# Patient Record
Sex: FEMALE | Race: WHITE | Employment: FULL TIME | URBAN - METROPOLITAN AREA
[De-identification: names, ages, dates, MRNs, and addresses within clinical notes are randomized per-mention and may not be internally consistent; named-entity substitution may affect disease eponyms.]

---

## 2021-10-28 ENCOUNTER — HOSPITAL ENCOUNTER (OUTPATIENT)
Dept: GENERAL RADIOLOGY | Age: 36
Discharge: HOME | End: 2021-10-28
Attending: NURSE PRACTITIONER

## 2021-10-28 DIAGNOSIS — M54.6 ACUTE BILATERAL THORACIC BACK PAIN: ICD-10-CM

## 2021-10-28 PROBLEM — G43.009 MIGRAINE WITHOUT AURA AND WITHOUT STATUS MIGRAINOSUS, NOT INTRACTABLE: Status: ACTIVE | Noted: 2021-10-28

## 2021-10-28 PROBLEM — Z00.00 PREVENTATIVE HEALTH CARE: Status: ACTIVE | Noted: 2021-10-28

## 2021-10-28 PROBLEM — K59.00 CONSTIPATION: Status: ACTIVE | Noted: 2021-10-28

## 2021-10-28 PROBLEM — F17.200 CURRENT SMOKER: Status: ACTIVE | Noted: 2021-10-28

## 2021-10-29 NOTE — PROGRESS NOTES
Please notify xray with mild spinal curvature but otherwise unremarkable.   If symptoms persist I recommend ortho referral.  am

## 2021-11-02 NOTE — PROGRESS NOTES
Per Yarelis Bass NP, Please notify xray with mild spinal curvature but otherwise unremarkable.   If symptoms persist I recommend ortho referral.--Notified via UTOPY message

## 2021-11-17 ENCOUNTER — HOSPITAL ENCOUNTER (OUTPATIENT)
Dept: PHYSICAL THERAPY | Age: 36
Discharge: HOME | End: 2021-11-17
Payer: OTHER GOVERNMENT

## 2021-11-17 DIAGNOSIS — M54.6 THORACIC BACK PAIN, UNSPECIFIED BACK PAIN LATERALITY, UNSPECIFIED CHRONICITY: ICD-10-CM

## 2021-11-17 PROCEDURE — 97110 THERAPEUTIC EXERCISES: CPT

## 2021-11-17 PROCEDURE — 97161 PT EVAL LOW COMPLEX 20 MIN: CPT

## 2021-11-17 PROCEDURE — 97140 MANUAL THERAPY 1/> REGIONS: CPT

## 2021-11-17 NOTE — THERAPY EVALUATION
Paresh Hamilton  : 1985      Payor: Hubert Hendrickson / Plan: Binh Grant 74 / Product Type:  /  [address] [facility] at [address] [place] [address]. [facility], [address], [facility], [address] [place] [address]  [phone]   [phone]       OUTPATIENT PHYSICAL THERAPY:Initial Assessment 2021      ICD-10: Treatment Diagnosis:   Pain in thoracic spine (M54.6)  Muscle weakness (generalized) (M62.81)                 Precautions/Allergies:   Fish containing products   Fall Risk Score: 0 (? 5 = High Risk)  MD Orders: Eval and Treat    MEDICAL/REFERRING DIAGNOSIS:  Thoracic back pain, unspecified back pain laterality, unspecified chronicity [M54.6]   DATE OF ONSET: About 1 year ago  REFERRING PHYSICIAN: Nirmal Murillo MD  RETURN PHYSICIAN APPOINTMENT: 2021     INITIAL ASSESSMENT:  Ms. Paresh Hamilton presents to physical therapy with decreased postural and hip/core strength, ROM, joint mobility, flexibility, functional mobility, and increased pain. No pelvic malalignment upon initial evaluation but decreased posture. These S/S are consistent with above diagnosis. Paresh Hamilton will benefit from skilled physical therapy (medically necessary) to address above deficits affecting participation in basic ADLs and functional mobility/tolerance. Patient will benefit from manual therapeutic techniques (stretching, joint mobilizations, soft tissue mobilization/myofascial release), therapeutic exercises and activities, postural strengthening/education, and comprehensive home exercises program to address current impairments and functional limitations. PROBLEM LIST (Impacting functional limitations):  1. Decreased Strength  2. Decreased ADL/Functional Activities  3. Decreased Transfer Abilities  4. Decreased Ambulation Ability/Technique  5. Decreased Balance  6. Increased Pain  7. Decreased Activity Tolerance  8. Increased Fatigue  9. Increased Shortness of Breath  10.  Decreased Flexibility/Joint Mobility  11. Decreased [place] with Home Exercise Program INTERVENTIONS PLANNED:  1. Balance Exercise  2. Bed Mobility  3. Cold  4. Cryotherapy  5. Electrical Stimulation  6. Family Education  7. Gait Training  8. Heat  9. Home Exercise Program (HEP)  10. Manual Therapy  11. Neuromuscular Re-education/Strengthening  12. Range of Motion (ROM)  13. Therapeutic Activites  14. Therapeutic Exercise/Strengthening  15. Transfer Training  16. Lumbar Traction  17. Aquatic Therapy   TREATMENT PLAN:  Effective Dates: 11/17/2021 TO 1/16/2022 (60 days). Frequency/Duration: 2 times a week for 60 Days  GOALS: (Goals have been discussed and agreed upon with patient.)     Short-Term Goals~4 weeks  Goal Met   1. Primus Backers will be independent with HEP 1.  [] Date:   2. Primus Backers will participate in LE stretching program to increase flexibility    2. [] Date:   3. Primus Backers will participate in core stabilization exercises to help with stabilization during ADLs 3. [] Date:   4. Primus Backers will participate in LE strengthening program with weights/resistance as appropriate to help with gait and elevations 4. [] Date:   5. Primus Backers will participate in static and dynamic balance activities to decrease the risk for falls       5. [] Date:   6. Primus Backers will tolerate manual therapy/joint mobilizations/soft tissue to increase ROM and decrease pain  6. [] Date:              Long Term Goals~8 weeks Goal Met   1. Primus Backers will demonstrate a 10 point improvement on the Oswestry to show improvement in function 1. [] Date:   2. Primus Backers will report 0/10 pain at rest and during ADLs  2. [] Date:   3. Primus Backers will demonstrate 5/5 LE strength on manual muscle testing 3. [] Date:   4. Primus Backers will be able to perform SLS >5 seconds bilaterally to help with gait and improve balance 4. [] Date:                 Outcome Measure:    Tool Used: Modified Oswestry Low Back Pain Questionnaire  Score:  Initial: 16/50  Most Recent: X/50 (Date: -- )   Interpretation of Score: Each section is scored on a 0-5 scale, 5 representing the greatest disability. The scores of each section are added together for a total score of 50. Medical Necessity:   · Skilled intervention continues to be required due to above deficits affecting participation in basic ADLs and overall functional tolerance. Reason for Services/Other Comments:  · Patient continues to require skilled intervention due to  above deficits affecting participation in basic ADLs and overall functional tolerance. Total Treatment Duration:  PT Patient Time In/Time Out  Time In: 0940  Time Out: 0843    Rehabilitation Potential For Stated Goals: [address] Mauricio's therapy, I certify that the treatment plan above will be carried out by a therapist or under their direction. Thank you for this referral,  Jammie Roman, YESENIA     Referring Physician Signature: Allyssa Costello MD _______________________________ Date _____________            HISTORY:   History of Present Injury/Illness (Reason for Referral):  Patient reports start of mid back pain about 1 year ago without known cause. She states that symptoms are worst in the AM and limit activity, cleaning and lifting.    -Present symptoms/complaints (on day of evaluation)tart of   Pain Scale:  · Current: 7/10  · Best: 2/10  · Worst: 9/10      · Aggravating factors: Standing, Walking, sitting, bending and Lifting   · Relieving factors: Rest  · Irritability: Medium (Onset of pain is equal to alleviation)      Past Medical History/Comorbidities:   Ms. Chris Gutierrez  has no past medical history on file. Ms. Chris Gutierrez  has a past surgical history that includes hx tubal ligation and hx tonsillectomy.   Social History/Living Environment:        Social History     Socioeconomic History    Marital status:      Spouse name: Not on file    Number of children: Not on file    Years of education: Not on file    Highest education level: Not on file   Occupational History    Not on file   Tobacco Use    Smoking status: Current Every Day Smoker     Packs/day: 0.50     Years: 18.00     Pack years: 9.00     Types: Cigarettes    Smokeless tobacco: Never Used   Substance and Sexual Activity    Alcohol use: Never    Drug use: Never    Sexual activity: Not on file   Other Topics Concern    Not on file   Social History Narrative    Not on file     Social Determinants of Health     Financial Resource Strain:     Difficulty of Paying Living Expenses: Not on file   Food Insecurity:     Worried About Running Out of Food in the Last Year: Not on file    Ted of Food in the Last Year: Not on file   Transportation Needs:     Lack of Transportation (Medical): Not on file    Lack of Transportation (Non-Medical):  Not on file   Physical Activity:     Days of Exercise per Week: Not on file    Minutes of Exercise per Session: Not on file   Stress:     Feeling of Stress : Not on file   Social Connections:     Frequency of Communication with Friends and Family: Not on file    Frequency of Social Gatherings with Friends and Family: Not on file    Attends Druze Services: Not on file    Active Member of [address] [place] [address] [employer] or Organizations: Not on file    Attends Club or Organization Meetings: Not on file    Marital Status: Not on file   Intimate Partner Violence:     Fear of Current or Ex-Partner: Not on file    Emotionally Abused: Not on file    Physically Abused: Not on file    Sexually Abused: Not on file   Housing Stability:     Unable to Pay for Housing in the Last Year: Not on file    Number of [place] in the Last Year: Not on file    Unstable Housing in the Last Year: Not on file     Prior Level of Function/Work/Activity:  Normal  Previous Treatment Approach  none  Dominant Side: Right  Other Clinical Tests  X-RAY Negative for Fx    Active Ambulatory Problems     Diagnosis Date Noted    Current smoker 10/28/2021    Preventative health care 10/28/2021    Migraine without aura and without status migrainosus, not intractable 10/28/2021    Constipation 10/28/2021     Resolved Ambulatory Problems     Diagnosis Date Noted    No Resolved Ambulatory Problems     No Additional Past Medical History     Note: Patient denies any increase of symptoms with cough, sneeze or valsalva. Patient denies any saddle paresthesia or bowel/bladder deficits. Current Medications:    Current Outpatient Medications:     predniSONE (DELTASONE) 10 mg tablet, Take 10 mg by mouth Follow dosing instructions for 12 days. , Disp: 48 Tablet, Rfl: 0    traMADoL (ULTRAM) 50 mg tablet, Take 1 Tablet by mouth every six (6) hours as needed for Pain for up to 7 days. Max Daily Amount: 200 mg., Disp: 28 Tablet, Rfl: 0    butalbital-acetaminophen-caffeine (FIORICET, ESGIC) -40 mg per tablet, Take 1 Tablet by mouth every six (6) hours as needed for Headache or Migraine. , Disp: 20 Tablet, Rfl: 0    diclofenac EC (VOLTAREN) 50 mg EC tablet, Take 1 Tablet by mouth two (2) times a day for 30 days. , Disp: 60 Tablet, Rfl: 0    buPROPion XL (WELLBUTRIN XL) 150 mg tablet, Take 1 Tablet by mouth every morning., Disp: 30 Tablet, Rfl: 0      Ambulatory/Rehab Services H2 Model Falls Risk Assessment    Risk Factors:       No Risk Factors Identified Ability to Rise from Chair:       (0)  Ability to rise in a single movement    Falls Prevention Plan:       No modifications necessary   Total: (5 or greater = High Risk): 0    ©2010 [facility] [employer]. All Rights Reserved. [facility] Patent #[id number].  Federal Law prohibits the replication, distribution or use without written permission from [facility] [employer]       Date Last Reviewed:  11/17/2021   Number of Personal Factors/Comorbidities that affect the Plan of Care: 0: LOW COMPLEXITY   EXAMINATION:   Observation/Orthostatic Postural Assessment:          Rounded Shoulders, Scoliosis Increased Lumbar Lordosis and Right Lateral Shift  Palpation:          Increased Tenderness to Spinous Process T8-10, L4-5    ROM:            AROM/PROM      Joint: Initial Assessment: 11/17/2021   Active ROM RIGHT LEFT   Knee Extension WNL WNL   Knee Flexion WNL WNL   Hip Flexion WNL WNL   Hip Abduction WNL WNL     Lumbar ROM     Movement Range Descriptor Degrees Notes   Flexion Hands to Shin Pain with Return 55    Extension Shoulder > Midline Pain at End Range 10    Sidebending Hands to Thigh Unremarkable 20               Repeated Motion:  Direction    Frequency Symptoms Prior Symptons Post   Flexion Repeated 10 times  Increased Symptoms   Extension Repeated 10 times  Increased Symptoms         Strength:    Initial Assessment:11/17/2021       RIGHT LEFT   Knee Flexion (L5-S2)  5/5  5/5   Knee Extension (L3, L4)  5/5  5/5   Hip Flexion (L1, L2)  4/5  4+/5   Hip Extension  4+/5  4+/5   Hip Abduction (L5, S1)  4/5  4+/5   Ankle Dorsiflexion (L4)  5/5  5/5   Great Toe Extension (L5)  5/5  5/5   Ankle Plantar Flexion (S1-S2)  5/5  5/5       Special Tests:  Lumbar:  SLR: Negative  SLUMP: Negative   SI Joint:  SI Compression: Positive Anterior rotation right   Hip:  Scouring:Negative  Fabers:Negative         Manual:  Initial Assessment  11/17/2021     Joint/Area Directon Grade Treatment Effect   Thoracic 8-10 PA II and III Reproduced Symptoms   Lumbar/SI  L3-5 PA II and III Improved Symptoms post treatment             Reflex Testing:    Location Left Right   Patellar (L4) normal normal   Achilles (S1) normal normal       Neurological Screen:    RADIATING SYMPTOMS: No  Upper motor [place] screen         Clonus: Negative         Babinski: Negative    Functional Mobility:  Affecting participation in basic ADLs and functional tasks.     Balance and Mobility:    Test Result   Timed up and Go NT   30 second Sit to Stand NT   Single Leg Balance Right: <15 seconds     Left:<30 seconds          Body Structures Involved:  1. Bones  2. Joints  3. Muscles  4. Ligaments Body Functions Affected:  1. Sensory/Pain  2. Neuromusculoskeletal  3. Movement Related Activities and Participation Affected:  1. Mobility  2.  Self Care   Number of elements that affect the Plan of Care: 1-2: LOW COMPLEXITY   CLINICAL PRESENTATION:   Presentation: Stable and uncomplicated: LOW COMPLEXITY   CLINICAL DECISION MAKING:      Use of outcome tool(s) and clinical judgement create a POC that gives a: Clear prediction of patient's progress: LOW COMPLEXITY     See associated treatment note for treatment provided today      Future Appointments   Date Time Provider Estela Mccoy   11/19/2021  9:00 AM Vahe Marlo, PT [facility]   11/22/2021  9:15 AM Vahe Marlo, PT [facility]   11/24/2021  9:15 AM Vahe Marlo, PT [facility]   11/29/2021  9:30 AM Vahe Marlo, PT [facility]   12/1/2021  9:30 AM Vahe Marlo, PT [facility]   12/6/2021  9:30 AM Vahe Marlo, PT [facility]   12/7/2021 10:00 AM Armando Maurice, NP [facility]   12/8/2021  9:30 AM Vahe Marlo, PT [facility]   12/13/2021  9:30 AM Vahe Marlo, PT [facility]   12/15/2021  9:30 AM Vahe Marlo, PT [facility]   12/20/2021  9:30 AM Vahe Marlo, PT [facility]   12/22/2021  9:30 AM Vahe Marlo, PT [facility]   12/29/2021  8:45 AM Veronica Elkins MD [facility] E[facility] Loaiza, PT

## 2021-11-17 NOTE — PROGRESS NOTES
[email or web address]  : 1985  Payor: Sahye Meza / Plan: Binh Grant 74 / Product Type:  /  [address] at [address] Vinay Gabriel, [address], [address]  [phone]   [phone]                                                          Lashon Koehler MD      OUTPATIENT PHYSICAL THERAPY: Daily Treatment Note 2021 Visit Count:  1    ICD-10: Treatment Diagnosis:   Pain in thoracic spine (M54.6)  Muscle weakness (generalized) (M62.81)                        Precautions/Allergies:   Fish containing products   Fall Risk Score: 0 (? 5 = High Risk)  MD Orders: Eval and Treat     MEDICAL/REFERRING DIAGNOSIS:  Thoracic back pain, unspecified back pain laterality, unspecified chronicity [M54.6]   DATE OF ONSET: About 1 year ago  REFERRING PHYSICIAN: Lashon Koehler MD  RETURN PHYSICIAN APPOINTMENT: 2021           Pre-treatment Symptoms/Complaints: See Initial Eval Dated 2021 for more details. Pain: Initial:7/10  Medications Last Reviewed:  2021     Post Session: 7/10   Updated Objective Findings: See Initial Eval for more details. TREATMENT:   THERAPEUTIC EXERCISE: (15 minutes):  Exercises per grid below to improve mobility, strength and balance. Required minimal visual, verbal and manual cues to promote proper body alignment and promote proper body posture. Progressed resistance and complexity of movement as indicated. Date:  2021 Date:   Date:     Activity/Exercise Parameters Parameters Parameters   Education HEP, POC, PT goals, anatomy/pathology     TM      Nustep      L stretch      SKTC [id number]     Open book 15x R/L     John pose [id number]     Cat Camel [id number]                             THERAPEUTIC ACTIVITY: ( 0 minutes):  Activities per gid below to improve functional movement related mobility, strength and balance to improve neuro-muscular carryover to daily functional activities for improving patient's quality of life. Required visual, verbal and manual cues to promote proper body alignment and promote proper body posture/mechanics. Progressed resistance and complexity of movement as indicated. Date:  11/17/2021 Date:   Date:     Activity/Exercise Parameters Parameters Parameters                                                                               MANUAL THERAPY: (8 minutes): Joint mobilization, Soft tissue mobilization was utilized and necessary because of the patient's restricted joint motion and restricted motion of soft tissue mobility. Date  11/17/2021    Technique Used Grade  Level # Time(s) Effect while being performed          Joint Mobs PA II III T8-10, L3-5 5min Improved trunk flexion ROM                 MET  R SI 3min For anterior rotation right innominate                              HEP Log Date 1.    11/17/2021   2.  11/17/2021   3. 11/17/2021   4.    5.           [email or web address] Portal  Treatment/Session Summary:    Response to Treatment: Pt demonstrated understanding of POC and initial HEP. No increase in pain or adverse reactions. Communication/Consultation:  POC, HEP, PT goals, Faxed initial evaluation to MD.   Equipment provided today: HEP Handout   Recommendations/Intent for next treatment session:   Next visit will focus on Manual Therapy Core Stability Pain Science Education Hip strengthening soft tissue mobilization. Treatment Plan of Care Effective Dates: 11/17/2021 TO 1/16/2022 (60 days).   Frequency/Duration: 2 times a week for 60 Days             Total Treatment Billable Duration:   23  Rx plus Eval   PT Patient Time In/Time Out  Time In: 0940  Time Out: 80 Jr Dotty Mortensen Se, PT    Future Appointments   Date Time Provider Estela Mccoy   11/19/2021  9:00 AM Anson Ledezma PT [facility]   11/22/2021  9:15 AM Anson Ledezma PT [facility]   11/24/2021  9:15 AM Anson Ledezma PT [facility] 11/29/2021  9:30 AM Miles Phi, PT [facility]   12/1/2021  9:30 AM Miles Phi, PT [facility]   12/6/2021  9:30 AM Miles Phi, PT [facility]   12/7/2021 10:00 AM Mey Sands, NP [facility]   12/8/2021  9:30 AM Miles Phi, PT [facility]   12/13/2021  9:30 AM Miles Phi, PT [facility]   12/15/2021  9:30 AM Miles Phi, PT [facility]   12/20/2021  9:30 AM Miles Phi, PT [facility] [place] [facility]   12/22/2021  9:30 AM Miles Phi, PT [facility]   12/29/2021  8:45 AM MD ALICE Chung A

## 2021-11-19 ENCOUNTER — HOSPITAL ENCOUNTER (OUTPATIENT)
Dept: PHYSICAL THERAPY | Age: 36
Discharge: HOME | End: 2021-11-19
Payer: OTHER GOVERNMENT

## 2021-11-19 PROCEDURE — 97110 THERAPEUTIC EXERCISES: CPT

## 2021-11-19 NOTE — PROGRESS NOTES
----- Message from Rayo Basilio sent at 4/11/2019 12:13 PM CDT -----  Contact: Patient  Type:  Patient Returning Call    Who Called:  Patient  Who Left Message for Patient:  Thuy Mahoney  Does the patient know what this is regarding?:  Previous message  Best Call Back Number:  [phone]  Additional Information:  NA   Eva Bush  : 1985  Payor: Laure Wick / Plan: Binh Grant 74 / Product Type:  /  [address] [facility] at [address] [place] [address]. William Mckeon, [address] [place], [address] [facility]  [phone]   [phone]                                                          Michael Garsia MD      OUTPATIENT PHYSICAL THERAPY: Daily Treatment Note 2021 Visit Count:  2    ICD-10: Treatment Diagnosis:   Pain in thoracic spine (M54.6)  Muscle weakness (generalized) (M62.81)                        Precautions/Allergies:   Fish containing products   Fall Risk Score: 0 (? 5 = High Risk)  MD Orders: Eval and Treat     MEDICAL/REFERRING DIAGNOSIS:  Thoracic back pain, unspecified back pain laterality, unspecified chronicity [M54.6]   DATE OF ONSET: About 1 year ago  REFERRING PHYSICIAN: Michael Garsia MD  RETURN PHYSICIAN APPOINTMENT: 2021           Pre-treatment Symptoms/Complaints: Patient reports improvement with starting stretching. Pain: Initial:4/10  Medications Last Reviewed:  2021     Post Session: 4/10   Updated Objective Findings: See Initial Eval for more details. TREATMENT:   THERAPEUTIC EXERCISE: (40 minutes):  Exercises per grid below to improve mobility, strength and balance. Required minimal visual, verbal and manual cues to promote proper body alignment and promote proper body posture. Progressed resistance and complexity of movement as indicated. Date:  2021 Date:  2021 Date:     Activity/Exercise Parameters Parameters Parameters   Education HEP, POC, PT goals, anatomy/pathology     TM      Nustep  8min L4    L stretch  [id number]    Rings  3min    SI self mob  [id number] right    SKTC [id number]     Open book 15x R/L 15x R/L    Cheryle Mohan pose [id number]     Cat Camel [id number]     Row  2x10 red    Low Row  2x10 red    No Money  2x10 green    Pull apart  2x10 green                      THERAPEUTIC ACTIVITY: ( 0 minutes):  Activities per gid below to improve functional movement related mobility, strength and balance to improve neuro-muscular carryover to daily functional activities for improving patient's quality of life. Required visual, verbal and manual cues to promote proper body alignment and promote proper body posture/mechanics. Progressed resistance and complexity of movement as indicated. Date:  11/19/2021 Date:   Date:     Activity/Exercise Parameters Parameters Parameters                                                                               MANUAL THERAPY: (0 minutes): Joint mobilization, Soft tissue mobilization was utilized and necessary because of the patient's restricted joint motion and restricted motion of soft tissue mobility. Date  11/19/2021    Technique Used Grade  Level # Time(s) Effect while being performed          Joint Mobs PA II III T8-10, L3-5 5min Improved trunk flexion ROM                 MET  R SI 3min For anterior rotation right innominate                              HEP Log Date 1.    11/19/2021   2.  11/19/2021   3. 11/19/2021   4.    5.           [facility] Portal  Treatment/Session Summary:    Response to Treatment: Pt demonstrated understanding of POC and initial HEP. No increase in pain or adverse reactions. Communication/Consultation:  POC, HEP, PT goals, Faxed initial evaluation to MD.   Equipment provided today: HEP Handout   Recommendations/Intent for next treatment session:   Next visit will focus on Manual Therapy Core Stability Pain Science Education Hip strengthening soft tissue mobilization. Treatment Plan of Care Effective Dates: 11/17/2021 TO 1/16/2022 (60 days).   Frequency/Duration: 2 times a week for 60 Days             Total Treatment Billable Duration:   40  Rx  PT Patient Time In/Time Out  Time In: 1606  Time Out: 9205 [facility], PT    Future Appointments   Date Time Provider Estela Mccoy   11/22/2021  9:15 AM Jc Brunner PT [facility] [place] [facility]   11/29/2021  9:30 AM Redell Hoof, PT [facility]   12/1/2021  9:30 AM Redell Hoof, PT [facility]   12/6/2021  9:30 AM Redell Hoof, PT [facility]   12/7/2021 10:00 AM Danielle Francis NP [facility]   12/8/2021  9:30 AM Redell Hoof, PT [facility]   12/13/2021  9:30 AM Redell Hoof, PT [facility]   12/15/2021  9:30 AM Redell Hoof, PT [facility]   12/20/2021  9:30 AM Redell Hoof, PT [facility] [place] [facility]   12/22/2021  9:30 AM Redell Hoof, PT [facility]   12/29/2021  8:45 AM Gerldine Gosselin, MD [facility]

## 2021-11-22 ENCOUNTER — HOSPITAL ENCOUNTER (OUTPATIENT)
Dept: PHYSICAL THERAPY | Age: 36
Discharge: HOME | End: 2021-11-22
Payer: OTHER GOVERNMENT

## 2021-11-22 PROCEDURE — 97110 THERAPEUTIC EXERCISES: CPT

## 2021-11-22 NOTE — PROGRESS NOTES
Marisol Ritter  : 1985  Payor: Ivis Snowball / Plan: Binh Grant 74 / Product Type:  /  [facility] [address]. [address], [address] [place] [address] Road  [phone]   [phone]                                                          Jordin Arambula MD      OUTPATIENT PHYSICAL THERAPY: Daily Treatment Note 2021 Visit Count:  3    ICD-10: Treatment Diagnosis:   Pain in thoracic spine (M54.6)  Muscle weakness (generalized) (M62.81)                        Precautions/Allergies:   Fish containing products   Fall Risk Score: 0 (? 5 = High Risk)  MD Orders: Eval and Treat     MEDICAL/REFERRING DIAGNOSIS:  Thoracic back pain, unspecified back pain laterality, unspecified chronicity [M54.6]   DATE OF ONSET: About 1 year ago  REFERRING PHYSICIAN: Jordin Arambula MD  RETURN PHYSICIAN APPOINTMENT: 2021           Pre-treatment Symptoms/Complaints: Patient reports being just tired from busy weekend. She also reports being able to do yard work without increased pain    Pain: Initial:1/10  Medications Last Reviewed:  2021     Post Session: 1/10   Updated Objective Findings: See Initial Eval for more details. TREATMENT:   THERAPEUTIC EXERCISE: (40 minutes):  Exercises per grid below to improve mobility, strength and balance. Required minimal visual, verbal and manual cues to promote proper body alignment and promote proper body posture. Progressed resistance and complexity of movement as indicated.      Date:  2021 Date:  2021 Date:  2021   Activity/Exercise Parameters Parameters Parameters   Education HEP, POC, PT goals, anatomy/pathology     TM      Nustep  8min L4 8min L4   L stretch  [id number]   Rings  3min 3min   SI self mob  [id number] right    SKTC [id number]     Open book 15x R/L 15x R/L    John pose [id number]     Cat Camel [id number]     Row  2x10 red 2x10  23#   Low Row  2x10 red    Pull down   2x10 27#   No Money  2x10 green 2x10 green   Pull apart  2x10 green 2x10 green   Julio carry   2/15#  4 laps   Bilateral ER w/ flexion and lift off   15x orange   Sit to stand   2x10 plinth               THERAPEUTIC ACTIVITY: ( 0 minutes): Activities per gid below to improve functional movement related mobility, strength and balance to improve neuro-muscular carryover to daily functional activities for improving patient's quality of life. Required visual, verbal and manual cues to promote proper body alignment and promote proper body posture/mechanics. Progressed resistance and complexity of movement as indicated. Date:  11/22/2021 Date:   Date:     Activity/Exercise Parameters Parameters Parameters                                                                               MANUAL THERAPY: (0 minutes): Joint mobilization, Soft tissue mobilization was utilized and necessary because of the patient's restricted joint motion and restricted motion of soft tissue mobility. Date  11/22/2021    Technique Used Grade  Level # Time(s) Effect while being performed          Joint Mobs PA II III T8-10, L3-5  Improved trunk flexion ROM                 MET  R SI  For anterior rotation right innominate                              HEP Log Date 1.    11/22/2021   2.  11/22/2021   3. 11/22/2021   4.    5.           [employer] [facility]  Treatment/Session Summary:    Response to Treatment: Patient was progressed with scapular and core strengthening without pain   Communication/Consultation:  Posture education   Equipment provided today: HEP Handout   Recommendations/Intent for next treatment session:   Next visit will focus on Manual Therapy Core Stability Pain Science Education Hip strengthening soft tissue mobilization. Treatment Plan of Care Effective Dates: 11/17/2021 TO 1/16/2022 (60 days).   Frequency/Duration: 2 times a week for 60 Days             Total Treatment Billable Duration:   40  Rx  PT Patient Time In/Time Out  Time In: 0915  Time Out: [address], PT    Future Appointments   Date Time Provider Estela Nadine   11/29/2021  9:30 AM Jeanie Victor, PT [facility] [place] [facility]   12/1/2021  9:30 AM Harl Valente, PT [facility]   12/6/2021  9:30 AM Harl Valente, PT [facility]   12/7/2021 10:00 AM Cyndi Frey NP [facility]   12/8/2021  9:30 AM Harl Valente, PT [facility]   12/13/2021  9:30 AM Harl Valente, PT [facility]   12/15/2021  9:30 AM Harl Valente, PT [facility]   12/20/2021  9:30 AM Harl Valente, PT [facility]   12/22/2021  9:30 AM Harl Valente, PT [facility]   12/29/2021  8:45 AM Loyd Hinkle MD [facility]

## 2021-11-24 ENCOUNTER — APPOINTMENT (OUTPATIENT)
Dept: PHYSICAL THERAPY | Age: 36
End: 2021-11-24
Payer: OTHER GOVERNMENT

## 2021-11-27 PROBLEM — Z00.00 PREVENTATIVE HEALTH CARE: Status: RESOLVED | Noted: 2021-10-28 | Resolved: 2021-11-27

## 2021-11-29 ENCOUNTER — HOSPITAL ENCOUNTER (OUTPATIENT)
Dept: PHYSICAL THERAPY | Age: 36
Discharge: HOME | End: 2021-11-29
Payer: OTHER GOVERNMENT

## 2021-11-29 NOTE — PROGRESS NOTES
Sumit Chanel  : 1985  Primary: [facility]  Secondary:  Therapy Center at [facility] [address]  [facility]. [facility] 06, [address]  [phone]   [phone]      PHYSICAL THERAPY    Patient unable to attend appointment today, not feeling well.     Princess Puri, PT

## 2021-12-01 ENCOUNTER — HOSPITAL ENCOUNTER (OUTPATIENT)
Dept: PHYSICAL THERAPY | Age: 36
Discharge: HOME | End: 2021-12-01
Payer: OTHER GOVERNMENT

## 2021-12-01 NOTE — PROGRESS NOTES
Grisel Aaron  : 1985  Primary: [facility]  Secondary:  Therapy Center at [facility] 39  [address]. [facility] 25, [address]  [phone]   [phone]      PHYSICAL THERAPY    Patient unable to attend appointment today, not feeling well.     Jonathan Carlos, PT

## 2021-12-06 ENCOUNTER — HOSPITAL ENCOUNTER (OUTPATIENT)
Dept: PHYSICAL THERAPY | Age: 36
Discharge: HOME | End: 2021-12-06
Payer: OTHER GOVERNMENT

## 2021-12-06 PROCEDURE — 97110 THERAPEUTIC EXERCISES: CPT

## 2021-12-06 NOTE — PROGRESS NOTES
Sharmila Terry  : 1985  Payor: Rito Alejandro / Plan: Binh Grant 74 / Product Type:  /  [address] [facility] at [address] [place] [address]. [address], [address] [place] [address]  [phone]   [phone]                                                          Kelly Gill MD      OUTPATIENT PHYSICAL THERAPY: Daily Treatment Note 2021 Visit Count:  4    ICD-10: Treatment Diagnosis:   Pain in thoracic spine (M54.6)  Muscle weakness (generalized) (M62.81)                        Precautions/Allergies:   Fish containing products   Fall Risk Score: 0 (? 5 = High Risk)  MD Orders: Eval and Treat     MEDICAL/REFERRING DIAGNOSIS:  Thoracic back pain, unspecified back pain laterality, unspecified chronicity [M54.6]   DATE OF ONSET: About 1 year ago  REFERRING PHYSICIAN: Kelly Gill MD  RETURN PHYSICIAN APPOINTMENT: 2021           Pre-treatment Symptoms/Complaints: Patient reports being out last week from sickness, doing much better with activity and currently no pain   Pain: Initial:0/10  Medications Last Reviewed:  2021     Post Session: 0/10   Updated Objective Findings: Normal ROM UE's        TREATMENT:   THERAPEUTIC EXERCISE: (42 minutes):  Exercises per grid below to improve mobility, strength and balance. Required minimal visual, verbal and manual cues to promote proper body alignment and promote proper body posture. Progressed resistance and complexity of movement as indicated.      Date:  2021 Date:  2021 Date:  2021 Date:  12/3/2021   Activity/Exercise Parameters Parameters Parameters    Education HEP, POC, PT goals, anatomy/pathology      TM       Nustep  8min L4 8min L4 8min L4   L stretch  [id number] 93t96izb 55k23jhr   Rings  3min 3min 3min   SI self mob  33w4sae right     SKTC [id number]      Open book 15x R/L 15x R/L     John pose [id number]      Cat Camel 16s7bet      Row  2x10 red 2x10  23# 3x10  27#   Low Row  2x10 red     Pull down   2x10 27# 3x10 30#   Pull walk    10x 30#   No Money  2x10 green 2x10 green    Pull apart  2x10 green 2x10 green    Julio carry   2/15#  4 laps 2/15#  4 laps   Bilateral ER w/ flexion and lift off   15x orange 2x10 orange   Sit to stand   2x10 plinth 2x10 w/DB OH press 5#   90/90 ER     2x10 yellow         THERAPEUTIC ACTIVITY: ( 0 minutes): Activities per gid below to improve functional movement related mobility, strength and balance to improve neuro-muscular carryover to daily functional activities for improving patient's quality of life. Required visual, verbal and manual cues to promote proper body alignment and promote proper body posture/mechanics. Progressed resistance and complexity of movement as indicated. Date:  12/6/2021 Date:   Date:     Activity/Exercise Parameters Parameters Parameters                                                                               MANUAL THERAPY: (0 minutes): Joint mobilization, Soft tissue mobilization was utilized and necessary because of the patient's restricted joint motion and restricted motion of soft tissue mobility. Date  12/6/2021    Technique Used Grade  Level # Time(s) Effect while being performed          Joint Mobs PA II III T8-10, L3-5  Improved trunk flexion ROM                 MET  R SI  For anterior rotation right innominate                              HEP Log Date 1.    12/6/2021   2.  12/6/2021   3. 12/6/2021   4.    5.           [employer] Portal  Treatment/Session Summary:    Response to Treatment: Patient had good tolerance to increased strengthening and able to maintain good posture. Communication/Consultation:  Posture education   Equipment provided today: HEP Handout   Recommendations/Intent for next treatment session:   Next visit will focus on Manual Therapy Core Stability Pain Science Education Hip strengthening soft tissue mobilization.          Treatment Plan of Care Effective Dates: 11/17/2021 TO 1/16/2022 (60 days).   Frequency/Duration: 2 times a week for 60 Days             Total Treatment Billable Duration:   42  Rx  PT Patient Time In/Time Out  Time In: 0930  Time Out: [address], PT    Future Appointments   Date Time Provider Estela Mccoy   12/7/2021 10:00 AM Maribel Anderson NP [facility] HTF   12/8/2021  9:30 AM Odilon Montana, PT [facility]   12/13/2021  9:30 AM Odilon Montana, PT [facility]   12/15/2021  9:30 AM Odilon Montana, PT [facility]   12/20/2021  9:30 AM Odilon Montana, PT [facility]   12/22/2021  9:30 AM Odilon Montana, PT [facility]   12/29/2021  8:45 AM MD ALICE Sheffield

## 2021-12-07 PROBLEM — M54.6 THORACIC BACK PAIN: Status: ACTIVE | Noted: 2021-12-07

## 2021-12-08 ENCOUNTER — HOSPITAL ENCOUNTER (OUTPATIENT)
Dept: PHYSICAL THERAPY | Age: 36
Discharge: HOME | End: 2021-12-08
Payer: OTHER GOVERNMENT

## 2021-12-08 PROCEDURE — 97110 THERAPEUTIC EXERCISES: CPT

## 2021-12-08 NOTE — PROGRESS NOTES
Celeine Book  : 1985  Payor: Ambreen Shells / Plan: Floyce Keturah / Product Type:  /  [address] [facility] at [address] [place] [address]. Jim Lindsey, [address], [address] [place] [address]  [phone]   [phone]                                                          Gerldine Gosselin, MD      OUTPATIENT PHYSICAL THERAPY: Daily Treatment Note 2021 Visit Count:  5    ICD-10: Treatment Diagnosis:   Pain in thoracic spine (M54.6)  Muscle weakness (generalized) (M62.81)                        Precautions/Allergies:   Fish containing products   Fall Risk Score: 0 (? 5 = High Risk)  MD Orders: Eval and Treat     MEDICAL/REFERRING DIAGNOSIS:  Thoracic back pain, unspecified back pain laterality, unspecified chronicity [M54.6]   DATE OF ONSET: About 1 year ago  REFERRING PHYSICIAN: Gerldine Gosselin, MD  RETURN PHYSICIAN APPOINTMENT: 2021           Pre-treatment Symptoms/Complaints: Patient reports doing better with all activity, just tired today   Pain: Initial:0/10  Medications Last Reviewed:  2021     Post Session: 0/10   Updated Objective Findings: Normal ROM UE's        TREATMENT:   THERAPEUTIC EXERCISE: (44 minutes):  Exercises per grid below to improve mobility, strength and balance. Required minimal visual, verbal and manual cues to promote proper body alignment and promote proper body posture. Progressed resistance and complexity of movement as indicated.      Date:  2021 Date:  2021 Date:  2021 Date:  2021 Date:  2021   Activity/Exercise Parameters Parameters Parameters     Education HEP, POC, PT goals, anatomy/pathology       UBE     4/4 min level 3   Nustep  8min L4 8min L4 8min L4    L stretch  45p01vdn 38t57pwm 95i11wna 10a54mcs   Rings  3min 3min 3min 3min   SI self mob  45a6fym right      SKTC 4h91nor       Open book 15x R/L 15x R/L      John pose [id number]       Cat Camel [id number]       Row  2x10 red 2x10  23# 3x10  27# 3x10  30#   Low Row  2x10 red      Pull down   2x10 27# 3x10 30# 3x10  33#   Pull walk    10x 30# 10x 33#   No Money  2x10 green 2x10 green     Pull apart  2x10 green 2x10 green     Julio carry   2/15#  4 laps 2/15#  4 laps 2/20#  4 laps   Bilateral ER w/ flexion and lift off   15x orange 2x10 orange    Sit to stand   2x10 plinth 2x10 w/DB OH press 5# 2x10 w/DB OH press 6#   90/90 ER     2x10 yellow 2x10 yellow   Prone UE FE Abd     2x10 each   Prone Swim     10x                 THERAPEUTIC ACTIVITY: ( 0 minutes): Activities per gid below to improve functional movement related mobility, strength and balance to improve neuro-muscular carryover to daily functional activities for improving patient's quality of life. Required visual, verbal and manual cues to promote proper body alignment and promote proper body posture/mechanics. Progressed resistance and complexity of movement as indicated. Date:  12/8/2021 Date:   Date:     Activity/Exercise Parameters Parameters Parameters                                                                               MANUAL THERAPY: (0 minutes): Joint mobilization, Soft tissue mobilization was utilized and necessary because of the patient's restricted joint motion and restricted motion of soft tissue mobility. Date  12/8/2021    Technique Used Grade  Level # Time(s) Effect while being performed          Joint Mobs PA II III T8-10, L3-5  Improved trunk flexion ROM                 MET  R SI  For anterior rotation right innominate                              HEP Log Date 1.    12/8/2021   2.  12/8/2021   3. 12/8/2021   4.    5.           [employer] [facility]  Treatment/Session Summary:    Response to Treatment: Patient progressing well with tolerance to dynamic activity, no reports of pain.    Communication/Consultation:  Posture education   Equipment provided today: HEP Handout   Recommendations/Intent for next treatment session:   Next visit will focus on Manual Therapy Core Stability Pain Science Education Hip strengthening soft tissue mobilization. Treatment Plan of Care Effective Dates: 11/17/2021 TO 1/16/2022 (60 days).   Frequency/Duration: 2 times a week for 60 Days             Total Treatment Billable Duration:   44  Rx  PT Patient Time In/Time Out  Time In: 2564  Time Out: Aman, YESENIA    Future Appointments   Date Time Provider Estela Mckeoni   12/8/2021  9:30 AM Elfida Jones, PT [facility] AND [facility]   12/13/2021  9:30 AM [facility]ida Jones, PT [facility]   12/15/2021  9:30 AM [facility]   12/20/2021  9:30 AM Elfida Jones, [facility]   12/22/2021  9:30 AM Elfida Jones, PT [facility]   12/29/2021  8:45 AM Vicenta Calixto MD [facility] POA   6/7/2022 10:00 AM Shara Emery NP [facility] HTF

## 2021-12-13 ENCOUNTER — HOSPITAL ENCOUNTER (OUTPATIENT)
Dept: PHYSICAL THERAPY | Age: 36
Discharge: HOME | End: 2021-12-13
Payer: OTHER GOVERNMENT

## 2021-12-13 NOTE — PROGRESS NOTES
Glosteraleksandr De Santiago  : 1985  Primary: [facility]  Secondary:  Therapy Center at [facility]  [address]. [address], [address] [facility] [address]  [phone]   [phone]      PHYSICAL THERAPY    Patient unable to attend appointment today, sick.     Leonardo Youssef, PT

## 2021-12-15 ENCOUNTER — HOSPITAL ENCOUNTER (OUTPATIENT)
Dept: PHYSICAL THERAPY | Age: 36
Discharge: HOME | End: 2021-12-15
Payer: OTHER GOVERNMENT

## 2021-12-15 NOTE — PROGRESS NOTES
Daksha Perdue  : 1985  Primary: [facility]  Secondary:  [facility] at [facility]  [facility]. [facility] 78, [address] [facility]  [phone]   [phone]      PHYSICAL THERAPY    Patient unable to attend appointment today, sick child.     Hang Wallace, PT

## 2021-12-20 ENCOUNTER — HOSPITAL ENCOUNTER (OUTPATIENT)
Dept: PHYSICAL THERAPY | Age: 36
Discharge: HOME | End: 2021-12-20
Payer: OTHER GOVERNMENT

## 2021-12-20 NOTE — PROGRESS NOTES
Al Viveros  : 1985  Primary: [facility]  Secondary:  Therapy Center at [facility] [address]  [facility]. [place] [address], [address] [facility]  [phone]   [phone]      PHYSICAL THERAPY    Patient unable to attend appointment today.     Omid Valdivia, PT

## 2021-12-22 ENCOUNTER — APPOINTMENT (OUTPATIENT)
Dept: PHYSICAL THERAPY | Age: 36
End: 2021-12-22
Payer: OTHER GOVERNMENT

## 2022-01-27 NOTE — THERAPY DISCHARGE
Eva Bush  : 1985      Payor: Laure Wick / Plan: Binh Grant 74 / Product Type:  /  [address] at [place] [address]. [facility], [address], [facility], [address] [place] [address] Road  [phone]   [phone]       OUTPATIENT PHYSICAL THERAPY:Discontinuation Summary 2022      ICD-10: Treatment Diagnosis:   Pain in thoracic spine (M54.6)  Muscle weakness (generalized) (M62.81)                 Precautions/Allergies:   Fish containing products   Fall Risk Score: 0 (? 5 = High Risk)  MD Orders: Eval and Treat    MEDICAL/REFERRING DIAGNOSIS:   Thoracic back pain, unspecified back pain laterality, unspecified chronicity   DATE OF ONSET: About 1 year ago  REFERRING PHYSICIAN: Michael Garsia MD  RETURN PHYSICIAN APPOINTMENT: 2021     INITIAL ASSESSMENT:  Ms. Eva Bush presents to physical therapy with decreased postural and hip/core strength, ROM, joint mobility, flexibility, functional mobility, and increased pain. No pelvic malalignment upon initial evaluation but decreased posture. These S/S are consistent with above diagnosis. Eva Bush will benefit from skilled physical therapy (medically necessary) to address above deficits affecting participation in basic ADLs and functional mobility/tolerance. Patient will benefit from manual therapeutic techniques (stretching, joint mobilizations, soft tissue mobilization/myofascial release), therapeutic exercises and activities, postural strengthening/education, and comprehensive home exercises program to address current impairments and functional limitations. DISCONTINUATION: Eva Bush has been seen in physical therapy from 2021 to 2021 for 5 visits. Treatment has been discontinued at this time due to patient failing to return for additional treatment secondary to being sick. The below goals were met prior to discontinuation. Some goals were not met due to unable to reassess.    Thank you for this referral.            PROBLEM LIST (Impacting functional limitations):  1. Decreased Strength  2. Decreased ADL/Functional Activities  3. Decreased Transfer Abilities  4. Decreased Ambulation Ability/Technique  5. Decreased Balance  6. Increased Pain  7. Decreased Activity Tolerance  8. Increased Fatigue  9. Increased Shortness of Breath  10. Decreased Flexibility/Joint Mobility  11. Decreased [place] with Home Exercise Program INTERVENTIONS PLANNED:  1. Balance Exercise  2. Bed Mobility  3. Cold  4. Cryotherapy  5. Electrical Stimulation  6. Family Education  7. Gait Training  8. Heat  9. Home Exercise Program (HEP)  10. Manual Therapy  11. Neuromuscular Re-education/Strengthening  12. Range of Motion (ROM)  13. Therapeutic Activites  14. Therapeutic Exercise/Strengthening  15. Transfer Training  16. Lumbar Traction  17. Aquatic Therapy   TREATMENT PLAN:  Effective Dates: 11/17/2021 TO 1/16/2022 (60 days). Frequency/Duration: 2 times a week for 60 Days  GOALS: (Goals have been discussed and agreed upon with patient.)     Short-Term Goals~4 weeks  Goal Met   1. Princess Ramirez will be independent with HEP 1. [x] Date: 12/8/2021   2. Princess Ramirez will participate in LE stretching program to increase flexibility    2. [x] Date: 12/8/2021   3. Princess Ramirez will participate in core stabilization exercises to help with stabilization during ADLs 3. [x] Date: 12/8/2021   4. Princess Ramirez will participate in LE strengthening program with weights/resistance as appropriate to help with gait and elevations 4. [x] Date: 12/8/2021   5. Princess Ramirez will participate in static and dynamic balance activities to decrease the risk for falls       5. [x] Date: 12/8/2021   6. Princess Ramirez will tolerate manual therapy/joint mobilizations/soft tissue to increase ROM and decrease pain  6. [x] Date: 12/8/2021              Long Term Goals~8 weeks Goal Met   1.  Princess Ramirez will demonstrate a 10 point improvement on the Oswestry to show improvement in function 1. [] Date:   2. Orly Ludwig will report 0/10 pain at rest and during ADLs  2. [] Date:   3. Orly Ludwig will demonstrate 5/5 LE strength on manual muscle testing 3. [] Date:   4. Orly Ludwig will be able to perform SLS >5 seconds bilaterally to help with gait and improve balance 4. [] Date:                 Outcome Measure: Tool Used: Modified Oswestry Low Back Pain Questionnaire  Score:  Initial: 16/50  Most Recent: X/50 (Date: -- )   Interpretation of Score: Each section is scored on a 0-5 scale, 5 representing the greatest disability. The scores of each section are added together for a total score of 50. Reason for Services/Other Comments:     Orly Ludwig has been seen in physical therapy from 11/17/2021 to 12/20/2021 for 5 visits. Treatment has been discontinued at this time due to patient failing to return for additional treatment secondary to being sick. The below goals were met prior to discontinuation. Some goals were not met due to unable to reassess. Thank you for this referral.         Regarding Yusuf Mauricio's therapy, I certify that the treatment plan above will be carried out by a therapist or under their direction. Thank you for this referral,  Apryl Isbell PT     Referring Physician Signature: Noemi Morales MD No Signature is Required for this note.

## 2022-03-16 ENCOUNTER — HOSPITAL ENCOUNTER (OUTPATIENT)
Dept: MRI IMAGING | Age: 37
Discharge: HOME | End: 2022-03-16
Attending: NURSE PRACTITIONER
Payer: OTHER GOVERNMENT

## 2022-03-16 DIAGNOSIS — G43.009 MIGRAINE WITHOUT AURA AND WITHOUT STATUS MIGRAINOSUS, NOT INTRACTABLE: ICD-10-CM

## 2022-03-16 PROCEDURE — 70551 MRI BRAIN STEM W/O DYE: CPT

## 2022-03-17 NOTE — PROGRESS NOTES
Pacheco Zepeda LPN called and spoke with pt, explained results and placed neurology referral yesterday 3/16/22. Pt was agreeable with the plan.   am

## 2022-03-18 PROBLEM — G43.009 MIGRAINE WITHOUT AURA AND WITHOUT STATUS MIGRAINOSUS, NOT INTRACTABLE: Status: ACTIVE | Noted: 2021-10-28

## 2022-03-18 PROBLEM — M54.6 THORACIC BACK PAIN: Status: ACTIVE | Noted: 2021-12-07

## 2022-03-19 PROBLEM — F17.200 CURRENT SMOKER: Status: ACTIVE | Noted: 2021-10-28

## 2022-03-19 PROBLEM — K59.00 CONSTIPATION: Status: ACTIVE | Noted: 2021-10-28

## 2022-03-25 VITALS — WEIGHT: 180 LBS | HEIGHT: 66 IN | BODY MASS INDEX: 28.93 KG/M2

## 2022-03-25 RX ORDER — TRAMADOL HYDROCHLORIDE 50 MG/1
50 TABLET ORAL
COMMUNITY
End: 2022-04-14

## 2022-03-25 NOTE — PERIOP NOTES
Enhanced Recovery After GYN Surgery: non-diabetic patients    It is highly recommended you purchase and drink Ensure Complete - one bottle twice daily for five days starting on 4/8/22 through 4/12/22. Ensure Complete is the preferred formula over other Ensure formulas. It is recommended that you continue drinking this for one month after surgery. The night before surgery on 4/13/22, drink 2 bottles of the Ensure Pre-Surgery drink. The morning of surgery on 4/14/22, drink one bottle of the Ensure Pre-Surgery drink while on your way to the hospital. Drink this over 5-10 minutes. Drink nothing else after drinking the pre-surgical drink the morning of surgery. Bring your patient handbook with you to the hospital.    Things to remember:    1. You will be given clear liquids to drink, advancing diet as tolerated    2. You will be up and moving around with assistance 2-4 hours after surgery. 3. You will be given regularly scheduled pain medications (NSAIDS, Tylenol, Gabapentin) with narcotics as needed. 4. You may be able to go home that night if the surgeon okays and you are up and eating and drinking. Otherwise, your discharge will be the following morning around lunch time. 5. Continue drinking Ensure Complete for 5 days after surgery.

## 2022-03-25 NOTE — PERIOP NOTES
PLEASE CONTINUE TAKING ALL PRESCRIPTION MEDICATIONS UP TO THE DAY OF SURGERY UNLESS OTHERWISE DIRECTED BELOW. DISCONTINUE all vitamins, herbals, and supplements 7 days prior to surgery. DISCONTINUE Non-Steriodal Anti-Inflammatory (NSAIDS) such as Advil, Ibuprofen, Motrin, Aspirin, and Aleve 5 days prior to surgery. Home Medications to take  the day of surgery (4/14/22)      Tramadol if needed            Home Medications   to Hold           Comments       On the day before surgery (4/13/22)  please take Acetaminophen 1000mg in the morning and then again before bed. You may substitute for Tylenol 650 mg. Please do not bring home medications with you on the day of surgery unless otherwise directed by your nurse. If you are instructed to bring home medications, please give them to your nurse as they will be administered by the nursing staff. If you have any questions, please call Radha Santacruz [phone]. A copy of this note was provided to the patient for reference.

## 2022-03-25 NOTE — PERIOP NOTES
Patient verified name and     Order for consent NOT found in EHR; patient verified. Type 2 surgery phone assessment complete. Labs per surgeon: unknown; orders NOT received. Labs per anesthesia protocol: HGB-to be collected on 22. T&S to be collected dos. EKG: not needed per anesthesia protocols. Patient informed of GYN class on 22 at which time labs will be drawn. Patient will also receive all patient education and hospital approved surgical skin cleanser to use per hospital policy. Patient instructed to hold all vitamins 7 days prior to surgery and NSAIDS 5 days prior to surgery, patient verbalized understanding. Patient answered medical/surgical history questions at their best of ability. All prior to admission medications documented in [facility].

## 2022-03-31 ENCOUNTER — HOSPITAL ENCOUNTER (OUTPATIENT)
Dept: SURGERY | Age: 37
Discharge: HOME | End: 2022-03-31

## 2022-04-06 ENCOUNTER — HOSPITAL ENCOUNTER (OUTPATIENT)
Dept: SURGERY | Age: 37
Discharge: HOME | End: 2022-04-06
Attending: OBSTETRICS & GYNECOLOGY
Payer: OTHER GOVERNMENT

## 2022-04-06 LAB — HGB BLD-MCNC: 14.4 G/DL (ref 11.7–15.4)

## 2022-04-06 PROCEDURE — 36415 COLL VENOUS BLD VENIPUNCTURE: CPT

## 2022-04-06 PROCEDURE — 85018 HEMOGLOBIN: CPT

## 2022-04-06 NOTE — PROGRESS NOTES
Patient attended [facility]/ GYN surgery orientation class today. Detailed instruction book regarding GYN surgery was provided at start of class. Class content included pre-operative instructions for surgery in the week prior to and day before surgery. Packet including Hibiclens and printed instructions on bathing was provided to patient. Detailed and printed diet instruction and presurgical drinks were also given to patient  in accordance with [facility] protocol. Detailed information was given regarding arriving at the hospital and instructions for the patient's day of surgery. Discussed recovery from surgery, hospital stay, pain management, and discharge. Reviewed recovery at home including pelvic rest, driving and activity restrictions, issues requiring call to physician etc. Vik Galo all questions in detail. Patient voices understanding of all.

## 2022-04-13 ENCOUNTER — ANESTHESIA EVENT (OUTPATIENT)
Dept: SURGERY | Age: 37
End: 2022-04-13
Payer: OTHER GOVERNMENT

## 2022-04-14 ENCOUNTER — ANESTHESIA (OUTPATIENT)
Dept: SURGERY | Age: 37
End: 2022-04-14
Payer: OTHER GOVERNMENT

## 2022-04-14 ENCOUNTER — HOSPITAL ENCOUNTER (OUTPATIENT)
Age: 37
Discharge: HOME | End: 2022-04-14
Attending: OBSTETRICS & GYNECOLOGY | Admitting: OBSTETRICS & GYNECOLOGY
Payer: OTHER GOVERNMENT

## 2022-04-14 VITALS
OXYGEN SATURATION: 97 % | RESPIRATION RATE: 20 BRPM | HEIGHT: 66 IN | DIASTOLIC BLOOD PRESSURE: 75 MMHG | BODY MASS INDEX: 28.78 KG/M2 | SYSTOLIC BLOOD PRESSURE: 121 MMHG | TEMPERATURE: 98.4 F | HEART RATE: 91 BPM | WEIGHT: 179.1 LBS

## 2022-04-14 DIAGNOSIS — Z98.890 S/P CYSTOSCOPY: ICD-10-CM

## 2022-04-14 DIAGNOSIS — Z90.79 STATUS POST BILATERAL SALPINGECTOMY: ICD-10-CM

## 2022-04-14 DIAGNOSIS — N92.3 SPOTTING BETWEEN MENSES: ICD-10-CM

## 2022-04-14 DIAGNOSIS — N93.9 ABNORMAL UTERINE BLEEDING (AUB): ICD-10-CM

## 2022-04-14 DIAGNOSIS — Z90.710 S/P LAPAROSCOPIC ASSISTED VAGINAL HYSTERECTOMY (LAVH): Primary | ICD-10-CM

## 2022-04-14 DIAGNOSIS — N85.2 ENLARGED UTERUS: ICD-10-CM

## 2022-04-14 DIAGNOSIS — R10.2 PELVIC PAIN: ICD-10-CM

## 2022-04-14 PROBLEM — N92.0 SPOTTING BETWEEN MENSES: Status: ACTIVE | Noted: 2022-04-14

## 2022-04-14 LAB
ABO + RH BLD: NORMAL
BLOOD GROUP ANTIBODIES SERPL: NORMAL
HCG UR QL: NEGATIVE
SPECIMEN EXP DATE BLD: NORMAL

## 2022-04-14 PROCEDURE — 77030010507 HC ADH SKN DERMBND J&J -B: Performed by: OBSTETRICS & GYNECOLOGY

## 2022-04-14 PROCEDURE — 74011000250 HC RX REV CODE- 250: Performed by: NURSE ANESTHETIST, CERTIFIED REGISTERED

## 2022-04-14 PROCEDURE — 77030008771 HC TU NG SALEM SUMP -A: Performed by: ANESTHESIOLOGY

## 2022-04-14 PROCEDURE — 77030003029 HC SUT VCRL J&J -B: Performed by: OBSTETRICS & GYNECOLOGY

## 2022-04-14 PROCEDURE — 77030039425 HC BLD LARYNG TRULITE DISP TELE -A: Performed by: ANESTHESIOLOGY

## 2022-04-14 PROCEDURE — 76060000035 HC ANESTHESIA 2 TO 2.5 HR: Performed by: OBSTETRICS & GYNECOLOGY

## 2022-04-14 PROCEDURE — 2709999900 HC NON-CHARGEABLE SUPPLY: Performed by: OBSTETRICS & GYNECOLOGY

## 2022-04-14 PROCEDURE — 2709999900 HC NON-CHARGEABLE SUPPLY

## 2022-04-14 PROCEDURE — 81025 URINE PREGNANCY TEST: CPT

## 2022-04-14 PROCEDURE — 88307 TISSUE EXAM BY PATHOLOGIST: CPT

## 2022-04-14 PROCEDURE — 74011250636 HC RX REV CODE- 250/636: Performed by: NURSE ANESTHETIST, CERTIFIED REGISTERED

## 2022-04-14 PROCEDURE — 74011250636 HC RX REV CODE- 250/636: Performed by: ANESTHESIOLOGY

## 2022-04-14 PROCEDURE — 77030012770 HC TRCR OPT FX AMR -B: Performed by: OBSTETRICS & GYNECOLOGY

## 2022-04-14 PROCEDURE — 76010000162 HC OR TIME 1.5 TO 2 HR INTENSV-TIER 1: Performed by: OBSTETRICS & GYNECOLOGY

## 2022-04-14 PROCEDURE — 77030011502 HC MANIP UTER ZUM ZINN -B: Performed by: OBSTETRICS & GYNECOLOGY

## 2022-04-14 PROCEDURE — 86900 BLOOD TYPING SEROLOGIC ABO: CPT

## 2022-04-14 PROCEDURE — 77030008606 HC TRCR ENDOSC KII AMR -B: Performed by: OBSTETRICS & GYNECOLOGY

## 2022-04-14 PROCEDURE — 77030008756 HC TU IRR SUC STRY -B: Performed by: OBSTETRICS & GYNECOLOGY

## 2022-04-14 PROCEDURE — 77030018720 HC DVC LIGSR ATLS COVD -E: Performed by: OBSTETRICS & GYNECOLOGY

## 2022-04-14 PROCEDURE — 77030018836 HC SOL IRR NACL ICUM -A: Performed by: OBSTETRICS & GYNECOLOGY

## 2022-04-14 PROCEDURE — 77030031139 HC SUT VCRL2 J&J -A: Performed by: OBSTETRICS & GYNECOLOGY

## 2022-04-14 PROCEDURE — 77030008518 HC TBNG INSUF ENDO STRY -B: Performed by: OBSTETRICS & GYNECOLOGY

## 2022-04-14 PROCEDURE — 77030002933 HC SUT MCRYL J&J -A: Performed by: OBSTETRICS & GYNECOLOGY

## 2022-04-14 PROCEDURE — 77030040922 HC BLNKT HYPOTHRM STRY -A: Performed by: ANESTHESIOLOGY

## 2022-04-14 PROCEDURE — 77030039147 HC PWDR HEMSTS SURGICEL JNJ -D: Performed by: OBSTETRICS & GYNECOLOGY

## 2022-04-14 PROCEDURE — 77030019908 HC STETH ESOPH SIMS -A: Performed by: ANESTHESIOLOGY

## 2022-04-14 PROCEDURE — 74011250636 HC RX REV CODE- 250/636: Performed by: OBSTETRICS & GYNECOLOGY

## 2022-04-14 PROCEDURE — 58554 LAPARO-VAG HYST W/T/O COMPL: CPT | Performed by: OBSTETRICS & GYNECOLOGY

## 2022-04-14 PROCEDURE — 77030037088 HC TUBE ENDOTRACH ORAL NSL COVD-A: Performed by: ANESTHESIOLOGY

## 2022-04-14 PROCEDURE — 77030003578 HC NDL INSUF VERES AMR -B: Performed by: OBSTETRICS & GYNECOLOGY

## 2022-04-14 PROCEDURE — 76210000006 HC OR PH I REC 0.5 TO 1 HR: Performed by: OBSTETRICS & GYNECOLOGY

## 2022-04-14 PROCEDURE — 74011250637 HC RX REV CODE- 250/637: Performed by: OBSTETRICS & GYNECOLOGY

## 2022-04-14 PROCEDURE — 77030040361 HC SLV COMPR DVT MDII -B: Performed by: OBSTETRICS & GYNECOLOGY

## 2022-04-14 PROCEDURE — 77030040830 HC CATH URETH FOL MDII -A: Performed by: OBSTETRICS & GYNECOLOGY

## 2022-04-14 PROCEDURE — 74011000250 HC RX REV CODE- 250: Performed by: OBSTETRICS & GYNECOLOGY

## 2022-04-14 PROCEDURE — 77030041236 HC APPL SURG ENDO JNJ -B: Performed by: OBSTETRICS & GYNECOLOGY

## 2022-04-14 RX ORDER — ROCURONIUM BROMIDE 10 MG/ML
INJECTION, SOLUTION INTRAVENOUS AS NEEDED
Status: DISCONTINUED | OUTPATIENT
Start: 2022-04-14 | End: 2022-04-14 | Stop reason: HOSPADM

## 2022-04-14 RX ORDER — PROMETHAZINE HYDROCHLORIDE 12.5 MG/1
25 TABLET ORAL
Qty: 30 TABLET | Refills: 0 | Status: SHIPPED | OUTPATIENT
Start: 2022-04-14 | End: 2022-04-21

## 2022-04-14 RX ORDER — MIDAZOLAM HYDROCHLORIDE 1 MG/ML
2 INJECTION, SOLUTION INTRAMUSCULAR; INTRAVENOUS ONCE
Status: COMPLETED | OUTPATIENT
Start: 2022-04-14 | End: 2022-04-14

## 2022-04-14 RX ORDER — PROPOFOL 10 MG/ML
INJECTION, EMULSION INTRAVENOUS AS NEEDED
Status: DISCONTINUED | OUTPATIENT
Start: 2022-04-14 | End: 2022-04-14 | Stop reason: HOSPADM

## 2022-04-14 RX ORDER — OXYCODONE HYDROCHLORIDE 5 MG/1
10 TABLET ORAL
Status: DISCONTINUED | OUTPATIENT
Start: 2022-04-14 | End: 2022-04-14 | Stop reason: HOSPADM

## 2022-04-14 RX ORDER — ONDANSETRON 8 MG/1
4 TABLET, ORALLY DISINTEGRATING ORAL
Qty: 30 TABLET | Refills: 1 | Status: SHIPPED | OUTPATIENT
Start: 2022-04-14 | End: 2022-04-29

## 2022-04-14 RX ORDER — NALOXONE HYDROCHLORIDE 0.4 MG/ML
0.1 INJECTION, SOLUTION INTRAMUSCULAR; INTRAVENOUS; SUBCUTANEOUS AS NEEDED
Status: DISCONTINUED | OUTPATIENT
Start: 2022-04-14 | End: 2022-04-14 | Stop reason: HOSPADM

## 2022-04-14 RX ORDER — NEOSTIGMINE METHYLSULFATE 1 MG/ML
INJECTION, SOLUTION INTRAVENOUS AS NEEDED
Status: DISCONTINUED | OUTPATIENT
Start: 2022-04-14 | End: 2022-04-14 | Stop reason: HOSPADM

## 2022-04-14 RX ORDER — HYDROMORPHONE HYDROCHLORIDE 1 MG/ML
0.5 INJECTION, SOLUTION INTRAMUSCULAR; INTRAVENOUS; SUBCUTANEOUS
Status: DISCONTINUED | OUTPATIENT
Start: 2022-04-14 | End: 2022-04-14 | Stop reason: HOSPADM

## 2022-04-14 RX ORDER — ONDANSETRON 4 MG/1
4 TABLET, ORALLY DISINTEGRATING ORAL
Status: DISCONTINUED | OUTPATIENT
Start: 2022-04-14 | End: 2022-04-14 | Stop reason: HOSPADM

## 2022-04-14 RX ORDER — OXYCODONE HYDROCHLORIDE 5 MG/1
5 TABLET ORAL
Status: DISCONTINUED | OUTPATIENT
Start: 2022-04-14 | End: 2022-04-14 | Stop reason: HOSPADM

## 2022-04-14 RX ORDER — ACETAMINOPHEN 325 MG/1
650 TABLET ORAL
Status: DISCONTINUED | OUTPATIENT
Start: 2022-04-14 | End: 2022-04-14 | Stop reason: HOSPADM

## 2022-04-14 RX ORDER — BUPIVACAINE HYDROCHLORIDE 2.5 MG/ML
INJECTION, SOLUTION EPIDURAL; INFILTRATION; INTRACAUDAL AS NEEDED
Status: DISCONTINUED | OUTPATIENT
Start: 2022-04-14 | End: 2022-04-14 | Stop reason: HOSPADM

## 2022-04-14 RX ORDER — LIDOCAINE HYDROCHLORIDE 10 MG/ML
0.1 INJECTION INFILTRATION; PERINEURAL AS NEEDED
Status: DISCONTINUED | OUTPATIENT
Start: 2022-04-14 | End: 2022-04-14 | Stop reason: HOSPADM

## 2022-04-14 RX ORDER — FENTANYL CITRATE 50 UG/ML
INJECTION, SOLUTION INTRAMUSCULAR; INTRAVENOUS AS NEEDED
Status: DISCONTINUED | OUTPATIENT
Start: 2022-04-14 | End: 2022-04-14 | Stop reason: HOSPADM

## 2022-04-14 RX ORDER — SODIUM CHLORIDE 0.9 % (FLUSH) 0.9 %
5-40 SYRINGE (ML) INJECTION AS NEEDED
Status: DISCONTINUED | OUTPATIENT
Start: 2022-04-14 | End: 2022-04-14 | Stop reason: HOSPADM

## 2022-04-14 RX ORDER — DOCUSATE SODIUM 100 MG/1
100 CAPSULE, LIQUID FILLED ORAL 2 TIMES DAILY
Status: DISCONTINUED | OUTPATIENT
Start: 2022-04-14 | End: 2022-04-14 | Stop reason: HOSPADM

## 2022-04-14 RX ORDER — CEFAZOLIN SODIUM/WATER 2 G/20 ML
2 SYRINGE (ML) INTRAVENOUS ONCE
Status: COMPLETED | OUTPATIENT
Start: 2022-04-14 | End: 2022-04-14

## 2022-04-14 RX ORDER — KETAMINE HYDROCHLORIDE 50 MG/ML
INJECTION, SOLUTION INTRAMUSCULAR; INTRAVENOUS AS NEEDED
Status: DISCONTINUED | OUTPATIENT
Start: 2022-04-14 | End: 2022-04-14 | Stop reason: HOSPADM

## 2022-04-14 RX ORDER — DIPHENHYDRAMINE HYDROCHLORIDE 50 MG/ML
12.5 INJECTION, SOLUTION INTRAMUSCULAR; INTRAVENOUS ONCE
Status: DISCONTINUED | OUTPATIENT
Start: 2022-04-14 | End: 2022-04-14 | Stop reason: HOSPADM

## 2022-04-14 RX ORDER — ONDANSETRON 2 MG/ML
4 INJECTION INTRAMUSCULAR; INTRAVENOUS ONCE
Status: DISCONTINUED | OUTPATIENT
Start: 2022-04-14 | End: 2022-04-14 | Stop reason: HOSPADM

## 2022-04-14 RX ORDER — GLYCOPYRROLATE 0.2 MG/ML
INJECTION INTRAMUSCULAR; INTRAVENOUS AS NEEDED
Status: DISCONTINUED | OUTPATIENT
Start: 2022-04-14 | End: 2022-04-14 | Stop reason: HOSPADM

## 2022-04-14 RX ORDER — ONDANSETRON 2 MG/ML
INJECTION INTRAMUSCULAR; INTRAVENOUS AS NEEDED
Status: DISCONTINUED | OUTPATIENT
Start: 2022-04-14 | End: 2022-04-14 | Stop reason: HOSPADM

## 2022-04-14 RX ORDER — IBUPROFEN 800 MG/1
800 TABLET ORAL
Qty: 60 TABLET | Refills: 2 | Status: SHIPPED | OUTPATIENT
Start: 2022-04-14 | End: 2022-04-29

## 2022-04-14 RX ORDER — ACETAMINOPHEN 500 MG
1000 TABLET ORAL ONCE
Status: DISCONTINUED | OUTPATIENT
Start: 2022-04-14 | End: 2022-04-14 | Stop reason: HOSPADM

## 2022-04-14 RX ORDER — SODIUM CHLORIDE, SODIUM LACTATE, POTASSIUM CHLORIDE, CALCIUM CHLORIDE 600; 310; 30; 20 MG/100ML; MG/100ML; MG/100ML; MG/100ML
100 INJECTION, SOLUTION INTRAVENOUS CONTINUOUS
Status: DISCONTINUED | OUTPATIENT
Start: 2022-04-14 | End: 2022-04-14 | Stop reason: HOSPADM

## 2022-04-14 RX ORDER — MIDAZOLAM HYDROCHLORIDE 1 MG/ML
2 INJECTION, SOLUTION INTRAMUSCULAR; INTRAVENOUS
Status: DISCONTINUED | OUTPATIENT
Start: 2022-04-14 | End: 2022-04-14 | Stop reason: HOSPADM

## 2022-04-14 RX ORDER — OXYCODONE HYDROCHLORIDE 5 MG/1
5 TABLET ORAL
Qty: 20 TABLET | Refills: 0 | Status: SHIPPED | OUTPATIENT
Start: 2022-04-14 | End: 2022-04-19

## 2022-04-14 RX ORDER — SODIUM CHLORIDE, SODIUM LACTATE, POTASSIUM CHLORIDE, CALCIUM CHLORIDE 600; 310; 30; 20 MG/100ML; MG/100ML; MG/100ML; MG/100ML
75 INJECTION, SOLUTION INTRAVENOUS CONTINUOUS
Status: DISCONTINUED | OUTPATIENT
Start: 2022-04-14 | End: 2022-04-14 | Stop reason: HOSPADM

## 2022-04-14 RX ORDER — SODIUM CHLORIDE 0.9 % (FLUSH) 0.9 %
5-40 SYRINGE (ML) INJECTION EVERY 8 HOURS
Status: DISCONTINUED | OUTPATIENT
Start: 2022-04-14 | End: 2022-04-14 | Stop reason: HOSPADM

## 2022-04-14 RX ORDER — FENTANYL CITRATE 50 UG/ML
100 INJECTION, SOLUTION INTRAMUSCULAR; INTRAVENOUS ONCE
Status: DISCONTINUED | OUTPATIENT
Start: 2022-04-14 | End: 2022-04-14 | Stop reason: HOSPADM

## 2022-04-14 RX ORDER — DEXAMETHASONE SODIUM PHOSPHATE 4 MG/ML
INJECTION, SOLUTION INTRA-ARTICULAR; INTRALESIONAL; INTRAMUSCULAR; INTRAVENOUS; SOFT TISSUE AS NEEDED
Status: DISCONTINUED | OUTPATIENT
Start: 2022-04-14 | End: 2022-04-14 | Stop reason: HOSPADM

## 2022-04-14 RX ORDER — KETOROLAC TROMETHAMINE 30 MG/ML
INJECTION, SOLUTION INTRAMUSCULAR; INTRAVENOUS AS NEEDED
Status: DISCONTINUED | OUTPATIENT
Start: 2022-04-14 | End: 2022-04-14 | Stop reason: HOSPADM

## 2022-04-14 RX ORDER — ACETAMINOPHEN 325 MG/1
1000 TABLET ORAL
Status: ON HOLD | COMMUNITY
End: 2022-04-14 | Stop reason: SDUPTHER

## 2022-04-14 RX ORDER — ACETAMINOPHEN 325 MG/1
650 TABLET ORAL
Qty: 60 TABLET | Refills: 0 | Status: SHIPPED | OUTPATIENT
Start: 2022-04-14 | End: 2022-04-29

## 2022-04-14 RX ORDER — LIDOCAINE HYDROCHLORIDE 20 MG/ML
INJECTION, SOLUTION EPIDURAL; INFILTRATION; INTRACAUDAL; PERINEURAL AS NEEDED
Status: DISCONTINUED | OUTPATIENT
Start: 2022-04-14 | End: 2022-04-14 | Stop reason: HOSPADM

## 2022-04-14 RX ORDER — NALOXONE HYDROCHLORIDE 0.4 MG/ML
0.4 INJECTION, SOLUTION INTRAMUSCULAR; INTRAVENOUS; SUBCUTANEOUS AS NEEDED
Status: DISCONTINUED | OUTPATIENT
Start: 2022-04-14 | End: 2022-04-14 | Stop reason: HOSPADM

## 2022-04-14 RX ADMIN — SODIUM CHLORIDE, SODIUM LACTATE, POTASSIUM CHLORIDE, AND CALCIUM CHLORIDE: 600; 310; 30; 20 INJECTION, SOLUTION INTRAVENOUS at 08:32

## 2022-04-14 RX ADMIN — ROCURONIUM BROMIDE 10 MG: 10 INJECTION, SOLUTION INTRAVENOUS at 08:33

## 2022-04-14 RX ADMIN — DOCUSATE SODIUM 100 MG: 100 CAPSULE ORAL at 12:52

## 2022-04-14 RX ADMIN — GLYCOPYRROLATE 0.6 MG: 0.2 INJECTION, SOLUTION INTRAMUSCULAR; INTRAVENOUS at 08:59

## 2022-04-14 RX ADMIN — KETOROLAC TROMETHAMINE 30 MG: 30 INJECTION, SOLUTION INTRAMUSCULAR; INTRAVENOUS at 09:08

## 2022-04-14 RX ADMIN — PROPOFOL 20 MG: 10 INJECTION, EMULSION INTRAVENOUS at 09:02

## 2022-04-14 RX ADMIN — OXYCODONE 5 MG: 5 TABLET ORAL at 12:52

## 2022-04-14 RX ADMIN — ACETAMINOPHEN 650 MG: 325 TABLET, FILM COATED ORAL at 18:24

## 2022-04-14 RX ADMIN — PHENYLEPHRINE HYDROCHLORIDE 100 MCG: 10 INJECTION INTRAVENOUS at 08:52

## 2022-04-14 RX ADMIN — ONDANSETRON 4 MG: 2 INJECTION INTRAMUSCULAR; INTRAVENOUS at 08:53

## 2022-04-14 RX ADMIN — FENTANYL CITRATE 100 MCG: 50 INJECTION INTRAMUSCULAR; INTRAVENOUS at 07:14

## 2022-04-14 RX ADMIN — PROPOFOL 200 MG: 10 INJECTION, EMULSION INTRAVENOUS at 07:16

## 2022-04-14 RX ADMIN — SODIUM CHLORIDE, SODIUM LACTATE, POTASSIUM CHLORIDE, AND CALCIUM CHLORIDE 100 ML/HR: 600; 310; 30; 20 INJECTION, SOLUTION INTRAVENOUS at 06:14

## 2022-04-14 RX ADMIN — PHENYLEPHRINE HYDROCHLORIDE 100 MCG: 10 INJECTION INTRAVENOUS at 08:38

## 2022-04-14 RX ADMIN — PHENYLEPHRINE HYDROCHLORIDE 100 MCG: 10 INJECTION INTRAVENOUS at 08:21

## 2022-04-14 RX ADMIN — PHENYLEPHRINE HYDROCHLORIDE 50 MCG: 10 INJECTION INTRAVENOUS at 08:32

## 2022-04-14 RX ADMIN — DOCUSATE SODIUM 100 MG: 100 CAPSULE ORAL at 18:03

## 2022-04-14 RX ADMIN — MIDAZOLAM 2 MG: 1 INJECTION INTRAMUSCULAR; INTRAVENOUS at 07:00

## 2022-04-14 RX ADMIN — KETAMINE HYDROCHLORIDE 40 MG: 50 INJECTION, SOLUTION INTRAMUSCULAR; INTRAVENOUS at 07:27

## 2022-04-14 RX ADMIN — DEXAMETHASONE SODIUM PHOSPHATE 6 MG: 4 INJECTION, SOLUTION INTRAMUSCULAR; INTRAVENOUS at 07:45

## 2022-04-14 RX ADMIN — LIDOCAINE HYDROCHLORIDE 80 MG: 20 INJECTION, SOLUTION EPIDURAL; INFILTRATION; INTRACAUDAL; PERINEURAL at 07:16

## 2022-04-14 RX ADMIN — ROCURONIUM BROMIDE 10 MG: 10 INJECTION, SOLUTION INTRAVENOUS at 08:02

## 2022-04-14 RX ADMIN — ROCURONIUM BROMIDE 40 MG: 10 INJECTION, SOLUTION INTRAVENOUS at 07:17

## 2022-04-14 RX ADMIN — HYDROMORPHONE HYDROCHLORIDE 0.5 MG: 1 INJECTION, SOLUTION INTRAMUSCULAR; INTRAVENOUS; SUBCUTANEOUS at 09:28

## 2022-04-14 RX ADMIN — Medication 4.5 MG: at 08:59

## 2022-04-14 RX ADMIN — HYDROMORPHONE HYDROCHLORIDE 0.5 MG: 1 INJECTION, SOLUTION INTRAMUSCULAR; INTRAVENOUS; SUBCUTANEOUS at 09:18

## 2022-04-14 RX ADMIN — Medication 2 G: at 07:32

## 2022-04-14 RX ADMIN — PHENYLEPHRINE HYDROCHLORIDE 50 MCG: 10 INJECTION INTRAVENOUS at 08:34

## 2022-04-14 NOTE — ANESTHESIA PREPROCEDURE EVALUATION
Relevant Problems   RESPIRATORY SYSTEM   (+) Current smoker      NEUROLOGY   (+) Migraine without aura and without status migrainosus, not intractable       Anesthetic History     PONV          Review of Systems / Medical History  Patient summary reviewed and pertinent labs reviewed    Pulmonary          Smoker         Neuro/Psych         Psychiatric history     Cardiovascular  Within defined limits                Exercise tolerance: >4 METS     GI/Hepatic/Renal  Within defined limits              Endo/Other  Within defined limits           Other Findings              Physical Exam    Airway  Mallampati: II  TM Distance: 4 - 6 cm  Neck ROM: normal range of motion   Mouth opening: Normal     Cardiovascular    Rhythm: regular  Rate: normal         Dental  No notable dental hx       Pulmonary  Breath sounds clear to auscultation               Abdominal  GI exam deferred       Other Findings            Anesthetic Plan    ASA: 2  Anesthesia type: general          Induction: Intravenous  Anesthetic plan and risks discussed with: Patient

## 2022-04-14 NOTE — PROGRESS NOTES
Pt to room and then bathroom. Family at bedside. Abdominal incision closed with glue and intact. Min vag drainage.  Will monitor

## 2022-04-14 NOTE — OP NOTES
Full Operative Report      Patient: Tu Yadav  MRN: [id number]  Date: 04/14/22      PreOp Diagnosis:   1. Abnormal uterine bleeding  2. Spotting between menses  3. Enlarged uterus  4. Pelvic pain      Post Op Diagnosis: same    Procedure performed: Laparoscopic-Assisted Vaginal Hysterectomy, Bilateral salpingectomy, Cystoscopy    Surgeon: Barber Joseph MD    Assistant: Boom Starks CST    Indications: 44yo female with longstanding history of AUB, chronic pelvic pain, and bleeding between her periods. Her AUB has been worsening and her spotting has been very painful and distressing to her. These issues have been unrelieved with conservative management. Pt desires to proceed with definitive surgical management with hysterectomy. Informed consent was obtained and all alternatives to procedure and route of procedure were reviewed. Anesthesia: General    EBL: 100    UOP: 150 via muñoz catheter that was removed at the end of the procedure. Findings: Normal external female genitalia, normal cervix. Enlarged boggy uterus. Bilateral ovaries noted to be normal in appearance and bilateral tubes were noted to be normal appearing and consistent with prior bilateral tubal ligation. No abnormalities of the pelvis were noted other than congestion from large uterus. Ureters were noted to have the expected pelvic course. Bladder was intact with efflux noted from BL ureteral orifices. A normal appendix was noted. Specimens: Uterus with bilateral fallopian tubes    Procedure in detail:     Informed consent was obtained. The patient was taken to the operating room and placed under general anesthesia. When general anesthesia was found to be adequate, the patient was prepped and draped in normal sterile fashion. A surgical time out was performed according to protocol. Attention was then turned to the patient's vagina. A Muñoz catheter was placed into the urethra.   A weighted speculum was placed in the posterior vagina. The anterior lip of the cervix was grasped with a single-tooth tenaculum. The uterus was sounded to 9 cm. Sanya  was placed for means of uterine manipulation. All other instruments were then removed from the vagina. Attention was then turned to the patient's abdomen. A 5 mm skin incision was made infraumbilically after injecting with marcaine. The Veress needle was then inserted. Intraperitoneal placement was confirmed with a water-filled syringe and a drop in cO2 pressure with insufflation. Once the abdomen was insufflated a 5mm port was inserted under direct visualization using an Optiscope. Intraperitoneal placement was confirmed again with the scope. Two 11 mm skin incisions were made in the right lower quadrant and left lower quadrant under direct visualization after injecting with marcaine. Hemostasis was assured throughout. Survey of the patient's abdomen revealed findings stated above. The uterus was enlarged to 8 to 10 weeks size. Left ovary appeared normal. Right ovary appeared normal. Bilateral fallopian tubes each appeared normal as well. Ureters were noted to follow the usual anatomic course bilaterally. The left fallopian tube was taken down with the ligasure and the left uteroovarian ligament was transected and ligated with the ligasure. The right tube was taken down with the ligasure and the right uteroovarian ligament was transected and ligated with the ligasure. Bilateral fallopian tubes were removed and placed in specimen container. The round ligaments were then also cauterized and transected with good hemostasis. The uterine arteries were then skeletonized bilaterally. The bladder flap was then created with blunt and sharp dissection. The bladder was then gently pushed down and off of the lower uterine segment. The uterine arteries were then cauterized bilaterally and transected. Hemostasis was seen throughout.   Attention was then turned back to the patient's vagina where a weighted speculum was placed and the uterine manipulator was removed. A double toothed tenaculum was placed on the anterior and posterior lips of the cervix and the cervix was injected with a solution of neosynephrine. [place] retractors were used to retract anteriorly and laterally. A scalpel was used to incise the cervix circumferentially and the mucosa was dissected bluntly. A posterior colpotomy was made with the wright scissors and a suture of 0-vicryl was placed to tag the posterior colpotomy to the posterior vaginal mucosa in the midline. The weighted speculum was replaced with a long-weighted speculum and a curved heany clamp was used to clamp the uterosacral ligaments bilaterally. They were then transected and suture ligated with 0-vicryl sutures which were tagged with hemostats. The anterior colpotomy was then made after blunt and sharp dissection with metzenbaum scissors. The shania was replaced to protect the bladder. The ligasure was used to take down remaining pedicles bilaterally and the uterus was removed and placed in specimen container with the fallopian tubes. The operating field was inspected and found to have hemostasis. The posterior peritoneum was transfixed to the posterior vaginal mucosa with a 0-vicryl in a running locked fashion. Bilateral uterosacrals were incorporated for a modified Stanton's culdoplasty. The vaginal cuff was closed with interrupted figure of eights of 0-vicryl suture in an anterior to posterior fashion. Hemostasis was again assured. The muñoz catheter was removed and a cystoscopy was then performed. There was no trauma seen in the bladder or urethra. Efflux was noted from bilateral ureteral orifices. The cystoscope was removed and the Muñoz catheter replaced. The abdomen was insufflated and inspected and hemostasis was noted. Surgicel powder was placed over the vaginal cuff for prophylaxis. Hemostasis was assured and confirmed during low-pressure environment during desufflation. The RLQ and LLQ trocars were then removed and hemostasis was noted. The infraumbilical trocar was removed and the incisions were closed with a 4-0 vicryl and dermabond was used on top of the skin incisions. Patient tolerated the procedure well. Sponge, lap, and needle counts were correct x2. The patient was taken to recovery in stable condition.     Complications: none       MD Daniel Wilson

## 2022-04-14 NOTE — PERIOP NOTES
TRANSFER - OUT REPORT:    Verbal report given to RN Fabby(name) on Aleisha [place]  being transferred to [facility](unit) for routine post - op       Report consisted of patients Situation, Background, Assessment and   Recommendations(SBAR). Information from the following report(s) SBAR, Kardex, OR Summary, Procedure Summary, Intake/Output and MAR was reviewed with the receiving nurse. Lines:   Peripheral IV 04/14/22 Left;Posterior Hand (Active)   Site Assessment Clean, dry, & intact 04/14/22 0945   Phlebitis Assessment 0 04/14/22 0945   Infiltration Assessment 0 04/14/22 0945   Dressing Status Clean, dry, & intact 04/14/22 0945   Dressing Type Transparent;Tape 04/14/22 0945   Hub Color/Line Status Infusing 04/14/22 0945        Opportunity for questions and clarification was provided.       Patient transported with:   O2 @ 3 liters

## 2022-04-14 NOTE — DISCHARGE INSTRUCTIONS
Patient Education        Vaginal Hysterectomy: What to Expect at Home  Your Recovery     A vaginal hysterectomy removes the uterus through the vagina. Your doctor made a cut (incision) in your vagina and removed the uterus. You can expect to feel better and stronger each day. But you might need pain medicine for a week or two. You may get tired easily or have less energy than usual. This may last for several weeks after surgery. And you also may have light vaginal bleeding for a few weeks. It's important to avoid lifting while you are recovering so that you can heal. It may take about 4 to 6 weeks to fully recover. The recovery time may be shorter for some people. This care sheet gives you a general idea about how long it will take for you to recover. But each person recovers at a different pace. Follow the steps below to get better as quickly as possible. How can you care for yourself at home? Activity    · Rest when you feel tired. Getting enough sleep will help you recover.     · Try to walk each day. Start by walking a little more than you did the day before. Bit by bit, increase the amount you walk. Walking boosts blood flow and helps prevent pneumonia and constipation.     · Avoid lifting anything that would make you strain. This may include a child, heavy grocery bags and milk containers, a heavy briefcase or backpack, cat litter or dog food bags, or a vacuum .     · Avoid strenuous activities, such as biking, jogging, weight lifting, or aerobic exercise, until your doctor says it is okay.     · You may shower. If you have incisions in your belly, pat them dry when you are done. Do not take a bath for the first 2 weeks or until your doctor tells you it is okay.     · Ask your doctor when you can drive again.     · You will probably need to take 2 to 4 weeks off from work. It depends on the type of work you do and how you feel.     · Ask your doctor when it's okay for you to have sex.    Diet    · You can eat your normal diet. If your stomach is upset, try bland, low-fat foods like plain rice, broiled chicken, toast, and yogurt.     · Drink plenty of fluids (unless your doctor tells you not to).     · You may notice that your bowel movements are not regular right after your surgery. This is common. Try to avoid constipation and straining with bowel movements. You may want to take a fiber supplement every day. If you have not had a bowel movement after a couple of days, ask your doctor about taking a mild laxative. Medicines    · Your doctor will tell you if and when you can restart your medicines. You will also get instructions about taking any new medicines.     · If you stopped taking aspirin or some other blood thinner, your doctor will tell you when to start taking it again.     · Take pain medicines exactly as directed. ? If the doctor gave you a prescription medicine for pain, take it as prescribed. ? If you are not taking a prescription pain medicine, ask your doctor if you can take an over-the-counter medicine.     · If your doctor prescribed antibiotics, take them as directed. Do not stop taking them just because you feel better. You need to take the full course of antibiotics.     · If you think your pain medicine is making you sick to your stomach:  ? Take your medicine after meals (unless your doctor tells you not to). ? Ask your doctor for a different pain medicine. Incision care    · If you had surgery with a laparoscope, you may have stitches over the cuts (incisions) the doctor made in your belly. If you have strips of tape over the incisions, leave the tape on for a week or until it falls off. Or follow your doctor's instructions for removing the tape.     · Wash the area daily with warm, soapy water and pat it dry. Don't use hydrogen peroxide or alcohol, which can slow healing. You may cover the area with a gauze bandage if it weeps or rubs against clothing.  Change the bandage every day.     · Keep the area clean and dry. Other instructions    · You may have some light vaginal bleeding. Wear sanitary pads if needed. Do not douche or use tampons. Follow-up care is a key part of your treatment and safety. Be sure to make and go to all appointments, and call your doctor if you are having problems. It's also a good idea to know your test results and keep a list of the medicines you take. When should you call for help? Call 911 anytime you think you may need emergency care. For example, call if:    · You passed out (lost consciousness).     · You have chest pain, are short of breath, or cough up blood. Call your doctor now or seek immediate medical care if:    · You have severe vaginal bleeding. This means that you are soaking through your usual pads every hour for 2 or more hours.     · You have vaginal discharge that has increased in amount or smells bad.     · You are sick to your stomach or cannot drink fluids.     · You have pain that does not get better after you take pain medicine.     · You have loose stitches, or your incision comes open.     · You have signs of infection, such as:  ? Increased pain, swelling, warmth, or redness. ? Red streaks leading from the incision. ? Pus draining from the incision. ? A fever.     · You have signs of a blood clot in your leg (called deep vein thrombosis), such as:  ? Pain in your calf, back of knee, thigh, or groin. ? Redness and swelling in your leg or groin.     · You cannot pass stools or gas.     · Bright red blood has soaked through the bandage over the incision. Watch closely for changes in your health, and be sure to contact your doctor if you have any problems. Where can you learn more? Go to http://www.gray.com/  Enter Q057 in the search box to learn more about \"Vaginal Hysterectomy: What to Expect at Home. \"  Current as of: November 22, 2021               Content Version: 13.2  © 8906-0960 [employer]. Care instructions adapted under license by [employer] (which disclaims liability or warranty for this information). If you have questions about a medical condition or this instruction, always ask your healthcare professional. [address] any warranty or liability for your use of this information.

## 2022-04-14 NOTE — PROGRESS NOTES
I have reviewed discharge instructions with the patient and spouse. The patient and spouse verbalized understanding.       IV discharged

## 2022-04-14 NOTE — ANESTHESIA POSTPROCEDURE EVALUATION
Procedure(s): HYSTERECTOMY VAGINAL ASSISTED LAPAROSCOPIC (LAVH) BILATERAL SALPINGECTOMY  CYSTOSCOPY. general    Anesthesia Post Evaluation      Multimodal analgesia: multimodal analgesia used between 6 hours prior to anesthesia start to PACU discharge  Patient location during evaluation: bedside  Patient participation: complete - patient participated  Level of consciousness: responsive to verbal stimuli  Pain management: adequate  Airway patency: patent  Anesthetic complications: no  Cardiovascular status: hemodynamically stable  Respiratory status: spontaneous ventilation  Hydration status: stable    Final Post Anesthesia Temperature Assessment:  Normothermia (36.0-37.5 degrees C)      INITIAL Post-op Vital signs:   Vitals Value Taken Time   /55 04/14/22 0955   Temp 36.7 °C (98.1 °F) 04/14/22 0912   Pulse 89 04/14/22 0958   Resp 16 04/14/22 0945   SpO2 96 % 04/14/22 0958   Vitals shown include unvalidated device data.

## 2022-04-28 NOTE — PROGRESS NOTES
4-28-22     [facility]    Post Discharge Phone Call    Pain: Controlled with Rx medications for the first 3 days, now no c/o pain    Diet: Tolerating regular diet without any c/o n/v    Ensure Complete pre and post surgery: yes    Ensure Pre-Surgery: yes, night before and morning of surgery    BM: 1-2 per day    Incision: C/D/I    Activity: light walking

## 2022-06-07 ENCOUNTER — OFFICE VISIT (OUTPATIENT)
Dept: FAMILY MEDICINE CLINIC | Facility: CLINIC | Age: 37
End: 2022-06-07
Payer: OTHER GOVERNMENT

## 2022-06-07 VITALS
BODY MASS INDEX: 28.25 KG/M2 | WEIGHT: 175 LBS | DIASTOLIC BLOOD PRESSURE: 80 MMHG | OXYGEN SATURATION: 98 % | SYSTOLIC BLOOD PRESSURE: 122 MMHG | HEART RATE: 68 BPM

## 2022-06-07 DIAGNOSIS — G43.009 MIGRAINE WITHOUT AURA AND WITHOUT STATUS MIGRAINOSUS, NOT INTRACTABLE: Primary | ICD-10-CM

## 2022-06-07 DIAGNOSIS — F17.200 CURRENT SMOKER: ICD-10-CM

## 2022-06-07 DIAGNOSIS — K59.00 CONSTIPATION, UNSPECIFIED CONSTIPATION TYPE: ICD-10-CM

## 2022-06-07 DIAGNOSIS — Z90.710 H/O: HYSTERECTOMY: ICD-10-CM

## 2022-06-07 PROCEDURE — 99212 OFFICE O/P EST SF 10 MIN: CPT | Performed by: NURSE PRACTITIONER

## 2022-06-08 ENCOUNTER — OFFICE VISIT (OUTPATIENT)
Dept: OBGYN CLINIC | Age: 37
End: 2022-06-08

## 2022-06-08 VITALS
BODY MASS INDEX: 28.77 KG/M2 | HEIGHT: 66 IN | SYSTOLIC BLOOD PRESSURE: 130 MMHG | WEIGHT: 179 LBS | DIASTOLIC BLOOD PRESSURE: 74 MMHG

## 2022-06-08 DIAGNOSIS — Z90.710 S/P LAPAROSCOPIC ASSISTED VAGINAL HYSTERECTOMY (LAVH): Primary | ICD-10-CM

## 2022-06-08 DIAGNOSIS — Z98.890 POST-OPERATIVE STATE: ICD-10-CM

## 2022-06-08 DIAGNOSIS — Z90.79 STATUS POST BILATERAL SALPINGECTOMY: ICD-10-CM

## 2022-06-08 PROCEDURE — 99024 POSTOP FOLLOW-UP VISIT: CPT | Performed by: OBSTETRICS & GYNECOLOGY

## 2022-06-08 NOTE — PROGRESS NOTES
Patient presents today for a post-op visit. Surgery Performed: JAQUELINE ASHER, cysto  Surgery Date: 4/14/22  Pathology: Benign    Vitals:    06/08/22 1032   BP: 130/74     Review of Systems   Constitutional: Negative. HENT: Negative. Eyes: Negative. Respiratory: Negative. Cardiovascular: Negative. Gastrointestinal: Negative. Endocrine: Negative. Genitourinary: Negative. Musculoskeletal: Negative. Allergic/Immunologic: Negative. Neurological: Negative. Hematological: Negative. Psychiatric/Behavioral: Negative. Physical Exam  Vitals signs reviewed. Constitutional:       General: She is not in acute distress. Appearance: Normal appearance. She is well-developed. She is not ill-appearing, toxic-appearing or diaphoretic. HENT:      Head: Normocephalic and atraumatic. Eyes:      General: No scleral icterus. Conjunctiva/sclera: Conjunctivae normal.      Pupils: Pupils are equal, round, and reactive to light. Neck:      Musculoskeletal: Normal range of motion. No acute abnormality  Pulmonary:      Effort: Pulmonary effort is normal. No respiratory distress. Abdominal:      No distension. Palpations: Abdomen is soft. There is no mass. Tenderness: There is no abdominal tenderness. There is no guarding or rebound. Genitourinary:     Labia:         Right: No rash, tenderness, lesion or injury. Left: No rash, tenderness, lesion or injury. Vagina: Normal. No signs of injury and foreign body. No vaginal discharge, vaginal cuff is intact with good support, no erythema, tenderness or bleeding. No granulation tissue. No abnormal discharge. Adnexa:         Right: No mass, tenderness or fullness. Left: No mass, tenderness or fullness. Comments: External genitalia are normal in appearance. Visual examination of anus and perineum shows no abnormalities. Musculoskeletal: Normal range of motion. General: No tenderness. Lymphadenopathy:      Cervical: No cervical adenopathy. Upper Body:      Right upper body: No supraclavicular adenopathy. Left upper body: No supraclavicular adenopathy. Lower Body: No right inguinal adenopathy. No left inguinal adenopathy. Skin:     General: Skin is warm and dry. Coloration: Skin is not pale. Findings: No erythema or rash. Neurological:      Mental Status: She is alert and oriented to person, place, and time. Cranial Nerves: No cranial nerve deficit. Motor: No abnormal muscle tone. Coordination: Coordination normal.      Deep Tendon Reflexes: Reflexes are normal and symmetric. Psychiatric:         Behavior: Behavior normal.         Thought Content: Thought content normal.         Judgment: Judgment normal.         1. S/P laparoscopic assisted vaginal hysterectomy (LAVH)    2. Status post bilateral salpingectomy    3. Post-operative state      Pt doing well post op. Exam is normal, reviewed indications for FU if needed. Cleared from all post op restrictions. Return if symptoms worsen or fail to improve, for Annual exam when due.

## 2022-06-09 ASSESSMENT — ENCOUNTER SYMPTOMS
SHORTNESS OF BREATH: 0
CONSTIPATION: 0
BACK PAIN: 0
SORE THROAT: 0
DIARRHEA: 0
VOMITING: 0
BLOOD IN STOOL: 0
SINUS PAIN: 0
EYE PAIN: 0
COUGH: 0
NAUSEA: 0
EYE REDNESS: 0

## 2022-06-09 NOTE — PROGRESS NOTES
Hal Greer (:  1985) is a 44 y.o. female,Established patient, here for evaluation of the following chief complaint(s):  Follow-up         ASSESSMENT/PLAN:  1. Migraine without aura and without status migrainosus, not intractable  2. H/O: hysterectomy  3. Current smoker  4. Constipation, unspecified constipation type    Current treatment plan is effective. No change in therapy. Encouraged smoking cessation, healthy diet and exercise. Return in about 6 months (around 2022), or if symptoms worsen or fail to improve, for cpx. Subjective   SUBJECTIVE/OBJECTIVE:  HPI  Follow-up for migraines. She is feeling well. Brain MRI 3/2022. Neurology referral sent. S/p hysterectomy 2022. States migraines, back pain and gastro issues (constipation) actually improved significantly after her surgery. Did not have breast reduction. Tried Wellbutrin XL for smoking cessation and depression without relief. Declines Chantix or Nicoderm patches at this time. Mood is good at present. She has no new concerns today. Review of Systems   Constitutional: Negative for chills and fever. HENT: Negative for congestion, ear pain, sinus pain and sore throat. Eyes: Negative for pain and redness. Respiratory: Negative for cough and shortness of breath. Cardiovascular: Negative for chest pain and palpitations. Gastrointestinal: Negative for blood in stool, constipation, diarrhea, nausea and vomiting. Endocrine: Negative for polydipsia. Genitourinary: Negative for dysuria, frequency and urgency. Musculoskeletal: Negative for arthralgias, back pain and myalgias. Skin: Negative for rash. Allergic/Immunologic: Negative for environmental allergies. Neurological: Negative for dizziness and headaches. Psychiatric/Behavioral: Negative for hallucinations and suicidal ideas. Objective   Physical Exam  Vitals and nursing note reviewed.    Constitutional:       General: She is not in acute distress. Appearance: Normal appearance. HENT:      Head: Normocephalic. Right Ear: External ear normal.      Left Ear: External ear normal.      Nose: Nose normal.      Mouth/Throat:      Lips: Pink. Mouth: Mucous membranes are moist.   Eyes:      Conjunctiva/sclera: Conjunctivae normal.      Pupils: Pupils are equal, round, and reactive to light. Cardiovascular:      Rate and Rhythm: Normal rate and regular rhythm. Pulmonary:      Effort: Pulmonary effort is normal.      Breath sounds: Normal breath sounds. No wheezing, rhonchi or rales. Musculoskeletal:         General: Normal range of motion. Cervical back: Normal range of motion. Right lower leg: No edema. Left lower leg: No edema. Lymphadenopathy:      Cervical: No cervical adenopathy. Skin:     General: Skin is warm and dry. Capillary Refill: Capillary refill takes less than 2 seconds. Findings: No rash. Neurological:      General: No focal deficit present. Mental Status: She is alert and oriented to person, place, and time. Mental status is at baseline. On this date 6/7/2022 I have spent 20-29 minutes reviewing previous notes, test results and face to face with the patient discussing the diagnosis and importance of compliance with the treatment plan as well as documenting on the day of the visit. An electronic signature was used to authenticate this note.     --CHERIE Rowell - CNP

## 2022-06-14 ENCOUNTER — TELEMEDICINE (OUTPATIENT)
Dept: NEUROLOGY | Age: 37
End: 2022-06-14
Payer: OTHER GOVERNMENT

## 2022-06-14 DIAGNOSIS — G35 MS (MULTIPLE SCLEROSIS) (HCC): Primary | ICD-10-CM

## 2022-06-14 PROCEDURE — 99203 OFFICE O/P NEW LOW 30 MIN: CPT | Performed by: PSYCHIATRY & NEUROLOGY

## 2022-06-14 NOTE — PROGRESS NOTES
Colton Lam (:  1985) is a New patient, here for evaluation of the following:    Assessment & Plan   Below is the assessment and plan developed based on review of pertinent history, physical exam, labs, studies, and medications. 1. MS (multiple sclerosis) (HCC)  Radiographic MS, no clinical event. Will arrange Lp, and full exam in office, check all labs needed. Return in about 4 weeks (around 2022). Subjective   MRI was done for chronic migraine on 3/16/2022, showing T2 signal changes suggested MS. Migraine has reduced intensity and frequency, 3-4 per month. Denied visual loss, imbalance or weakness, has occasional numbness in tips of fingers bilaterally. Sometimes fatigue, good energy most of time. No other medical issues. FH no autoimmune disease of MS. Had hysterectomy. Reviewed MRI pictures with pt, T2 signal changes perpendicular to vents, 2 in lower pontine, strongly suggest MS.      Review of Systems       Objective   Patient-Reported Vitals  No data recorded     Physical Exam  [INSTRUCTIONS:  \"[x]\" Indicates a positive item  \"[]\" Indicates a negative item  -- DELETE ALL ITEMS NOT EXAMINED]    Constitutional: [x] Appears well-developed and well-nourished [x] No apparent distress      [] Abnormal -     Mental status: [x] Alert and awake  [x] Oriented to person/place/time [x] Able to follow commands    [] Abnormal -     Eyes:   EOM    [x]  Normal    [] Abnormal -   Sclera  [x]  Normal    [] Abnormal -          Discharge [x]  None visible   [] Abnormal -     HENT: [x] Normocephalic, atraumatic  [] Abnormal -   [x] Mouth/Throat: Mucous membranes are moist    External Ears [x] Normal  [] Abnormal -    Neck: [x] No visualized mass [] Abnormal -     Pulmonary/Chest: [x] Respiratory effort normal   [x] No visualized signs of difficulty breathing or respiratory distress        [] Abnormal -      Musculoskeletal:   [x] Normal gait with no signs of ataxia         [x] Normal range of motion of neck        [] Abnormal -     Neurological:        [x] No Facial Asymmetry (Cranial nerve 7 motor function) (limited exam due to video visit)          [x] No gaze palsy        [] Abnormal -          Skin:        [x] No significant exanthematous lesions or discoloration noted on facial skin         [] Abnormal -            Psychiatric:       [x] Normal Affect [] Abnormal -        [x] No Hallucinations    Other pertinent observable physical exam findings:-        On this date 6/14/2022 I have spent 40 minutes reviewing previous notes, test results and face to face (virtual) with the patient discussing the diagnosis and importance of compliance with the treatment plan as well as documenting on the day of the visit. Tania Lim, was evaluated through a synchronous (real-time) audio-video encounter. The patient (or guardian if applicable) is aware that this is a billable service, which includes applicable co-pays. This Virtual Visit was conducted with patient's (and/or legal guardian's) consent. The visit was conducted pursuant to the emergency declaration under the [address] [place] [address] authority and the [employer] and [employer] Act. Patient identification was verified, and a caregiver was present when appropriate. The patient was located at Home: [address] [place] [address] [address] [id number].    Provider was located at Home ([address]): Ashia Man MD

## 2022-06-15 DIAGNOSIS — G35 MS (MULTIPLE SCLEROSIS) (HCC): Primary | ICD-10-CM

## 2022-06-23 ENCOUNTER — HOSPITAL ENCOUNTER (OUTPATIENT)
Dept: INTERVENTIONAL RADIOLOGY/VASCULAR | Age: 37
Discharge: HOME | End: 2022-06-26
Payer: OTHER GOVERNMENT

## 2022-06-23 VITALS
HEART RATE: 89 BPM | TEMPERATURE: 98.6 F | DIASTOLIC BLOOD PRESSURE: 67 MMHG | RESPIRATION RATE: 18 BRPM | SYSTOLIC BLOOD PRESSURE: 116 MMHG | OXYGEN SATURATION: 97 %

## 2022-06-23 DIAGNOSIS — G35 MS (MULTIPLE SCLEROSIS) (HCC): ICD-10-CM

## 2022-06-23 LAB
APPEARANCE FLD: CLEAR
COLOR FLD: COLORLESS
GLUCOSE CSF-MCNC: 62 MG/DL (ref 40–70)
NUC CELL # FLD: 4 /CU MM
PROT CSF-MCNC: 129 MG/DL (ref 15–45)
RBC # FLD: 3 /CU MM
SPECIMEN SOURCE FLD: NORMAL
TUBE # CSF: ABNORMAL
TUBE # CSF: NORMAL

## 2022-06-23 PROCEDURE — 86617 LYME DISEASE ANTIBODY: CPT

## 2022-06-23 PROCEDURE — 82164 ANGIOTENSIN I ENZYME TEST: CPT

## 2022-06-23 PROCEDURE — 82945 GLUCOSE OTHER FLUID: CPT

## 2022-06-23 PROCEDURE — 2709999900 IR LUMBAR PUNCTURE FOR DIAGNOSIS

## 2022-06-23 PROCEDURE — 87205 SMEAR GRAM STAIN: CPT

## 2022-06-23 PROCEDURE — 82040 ASSAY OF SERUM ALBUMIN: CPT

## 2022-06-23 PROCEDURE — 2500000003 HC RX 250 WO HCPCS: Performed by: PHYSICIAN ASSISTANT

## 2022-06-23 PROCEDURE — 89050 BODY FLUID CELL COUNT: CPT

## 2022-06-23 PROCEDURE — 84157 ASSAY OF PROTEIN OTHER: CPT

## 2022-06-23 PROCEDURE — 86592 SYPHILIS TEST NON-TREP QUAL: CPT

## 2022-06-23 RX ORDER — LIDOCAINE HYDROCHLORIDE 20 MG/ML
INJECTION, SOLUTION INFILTRATION; PERINEURAL
Status: COMPLETED | OUTPATIENT
Start: 2022-06-23 | End: 2022-06-23

## 2022-06-23 RX ADMIN — LIDOCAINE HYDROCHLORIDE 80 MG: 20 INJECTION, SOLUTION INFILTRATION; PERINEURAL at 11:00

## 2022-06-23 NOTE — OR NURSING
TRANSFER - OUT REPORT:       Verbal report given to YRN Light on Nick Fiddler  being transferred to IR room 2 for routine post-op          Report consisted of patients Situation, Background, Assessment and      Recommendations(SBAR). nformation from the following report(s) SBAR and Procedure Summary was reviewed with the receiving nurse. Opportunity for questions and clarification was provided. Pt tolerated procedure well.

## 2022-06-24 LAB
ANGIOTENSIN CONVERTING ENZYME CSF: <1.5 U/L (ref 0–2.5)
REAGIN AB CSF QL: NON REACTIVE

## 2022-06-27 LAB
ALB CSF/SERPL: 18 {RATIO} (ref 0–8)
ALBUMIN CSF-MCNC: 87 MG/DL (ref 7–29)
ALBUMIN SERPL-MCNC: 4.8 G/DL (ref 3.8–4.8)
IGG CSF-MCNC: 7.6 MG/DL (ref 0–6.7)
IGG SERPL-MCNC: 791 MG/DL (ref 586–1602)
IGG SYNTH RATE SER+CSF CALC-MRATE: 3.9 MG/DAY
IGG/ALB CLEAR SER+CSF-RTO: 0.5 (ref 0–0.7)
IGG/ALB CSF: 0.09 {RATIO} (ref 0–0.25)
OLIGOCLONAL BANDS.IT SER+CSF QL: ABNORMAL

## 2022-06-28 LAB
BACTERIA SPEC CULT: NORMAL
GRAM STN SPEC: NORMAL
GRAM STN SPEC: NORMAL
SERVICE CMNT-IMP: NORMAL

## 2022-07-01 LAB
B BURGDOR IGG PATRN CSF IB-IMP: NEGATIVE
B BURGDOR IGM PATRN CSF IB-IMP: NEGATIVE
B BURGDOR18KD IGG CSF QL IB: ABNORMAL
B BURGDOR23KD IGG CSF QL IB: ABNORMAL
B BURGDOR23KD IGM CSF QL IB: ABNORMAL
B BURGDOR28KD IGG CSF QL IB: ABNORMAL
B BURGDOR30KD IGG CSF QL IB: ABNORMAL
B BURGDOR39KD IGG CSF QL IB: ABNORMAL
B BURGDOR39KD IGM CSF QL IB: ABNORMAL
B BURGDOR41KD IGG CSF QL IB: PRESENT
B BURGDOR41KD IGM CSF QL IB: ABNORMAL
B BURGDOR45KD IGG CSF QL IB: ABNORMAL
B BURGDOR58KD IGG CSF QL IB: ABNORMAL
B BURGDOR66KD IGG CSF QL IB: ABNORMAL
B BURGDOR93KD IGG CSF QL IB: ABNORMAL

## 2022-07-05 ASSESSMENT — ENCOUNTER SYMPTOMS
RESPIRATORY NEGATIVE: 1
GASTROINTESTINAL NEGATIVE: 1
EYES NEGATIVE: 1
ALLERGIC/IMMUNOLOGIC NEGATIVE: 1

## 2022-07-20 ENCOUNTER — OFFICE VISIT (OUTPATIENT)
Dept: NEUROLOGY | Age: 37
End: 2022-07-20
Payer: OTHER GOVERNMENT

## 2022-07-20 VITALS
HEIGHT: 66 IN | SYSTOLIC BLOOD PRESSURE: 131 MMHG | HEART RATE: 80 BPM | BODY MASS INDEX: 28.77 KG/M2 | DIASTOLIC BLOOD PRESSURE: 87 MMHG | WEIGHT: 179 LBS

## 2022-07-20 DIAGNOSIS — G35 MS (MULTIPLE SCLEROSIS) (HCC): Primary | ICD-10-CM

## 2022-07-20 DIAGNOSIS — G43.009 MIGRAINE WITHOUT AURA AND WITHOUT STATUS MIGRAINOSUS, NOT INTRACTABLE: ICD-10-CM

## 2022-07-20 PROCEDURE — 99215 OFFICE O/P EST HI 40 MIN: CPT | Performed by: PSYCHIATRY & NEUROLOGY

## 2022-07-20 RX ORDER — VENLAFAXINE HYDROCHLORIDE 37.5 MG/1
37.5 CAPSULE, EXTENDED RELEASE ORAL DAILY
Qty: 30 CAPSULE | Refills: 3 | Status: SHIPPED | OUTPATIENT
Start: 2022-07-20

## 2022-07-20 RX ORDER — RIZATRIPTAN BENZOATE 10 MG/1
10 TABLET ORAL
Qty: 10 TABLET | Refills: 3 | Status: SHIPPED | OUTPATIENT
Start: 2022-07-20 | End: 2022-07-20

## 2022-07-20 ASSESSMENT — ENCOUNTER SYMPTOMS
RESPIRATORY NEGATIVE: 1
EYES NEGATIVE: 1
GASTROINTESTINAL NEGATIVE: 1
BACK PAIN: 1

## 2022-07-20 ASSESSMENT — VISUAL ACUITY: VA_NORMAL: 1

## 2022-07-20 NOTE — Clinical Note
Pls mail all labs, one has to be done at [facility]. The rest in [facility]. Referral to [address] with notes, MRI reports and CSF results.  josué

## 2022-07-20 NOTE — PROGRESS NOTES
7/20/2022  Sav Vickers 44 y.o. female      Chief Complaint:  Chief Complaint   Patient presents with    Follow-up    Multiple Sclerosis          Followup Note:   Past few years tingling sensation from neck all way down sometimes extending to legs when she bended her neck. No pain. Recently left arm numbness lasting few minutes. No cognitive dysfunction, no depression or anxiety, sleeps well. Fatigued past 2-3 years, constantly fatigued. No hx of infectious disease including Lyme, HIV. Migraine 10 years, once a week. Duration 3 hours to a day, up to 7 days. Asso with visual disturbance. Tried fioricet not helping. No preventives in the past. No HTN or heart problem, no kidney stone or glaucoma, no asthma. Review Test Results: I have reviewed imaging study and lab tests, discussed results with patient in detail. CSF finding showed increased albumin index, mildly increased IgG synthesis rate, oligo bands 0, protein 125, wbc 4. VDRL and ACE neg. Current Outpatient Medications   Medication Sig Dispense Refill    venlafaxine (EFFEXOR XR) 37.5 MG extended release capsule Take 1 capsule by mouth in the morning. 30 capsule 3    rizatriptan (MAXALT) 10 MG tablet Take 1 tablet by mouth once as needed for Migraine May repeat in 2 hours if needed, max 2/24h. 10 tablet 3     No current facility-administered medications for this visit. No Known Allergies      Review of Systems:  Review of Systems   Constitutional: Negative. HENT: Negative. Eyes: Negative. Respiratory: Negative. Cardiovascular: Negative. Gastrointestinal: Negative. Genitourinary: Negative. Musculoskeletal:  Positive for back pain. Skin: Negative. Neurological:  Positive for tingling and headaches. Psychiatric/Behavioral: Negative.           Examination:  Vitals:    07/20/22 0811   BP: 131/87   Site: Left Upper Arm   Position: Sitting   Pulse: 80   Weight: 179 lb (81.2 kg)   Height: 5' 6\" (1.676 m) Physical Exam  Constitutional:       Appearance: She is normal weight. HENT:      Head: Normocephalic. Eyes:      Extraocular Movements: Extraocular movements intact and EOM normal.      Conjunctiva/sclera: Conjunctivae normal.      Pupils: Pupils are equal, round, and reactive to light. Cardiovascular:      Rate and Rhythm: Normal rate. Pulmonary:      Effort: Pulmonary effort is normal.   Musculoskeletal:         General: Normal range of motion. Cervical back: Normal range of motion. Skin:     General: Skin is warm and dry. Neurological:      Mental Status: She is alert and oriented to person, place, and time. Cranial Nerves: No cranial nerve deficit. Sensory: No sensory deficit. Motor: No weakness. Coordination: Coordination normal. Finger-Nose-Finger Test, Heel to [employer] and Romberg Test normal.      Gait: Gait is intact. Gait normal.      Deep Tendon Reflexes: Strength normal. Reflexes normal.      Reflex Scores:       Tricep reflexes are 2+ on the right side and 2+ on the left side. Bicep reflexes are 2+ on the right side and 2+ on the left side. Brachioradialis reflexes are 2+ on the right side and 2+ on the left side. Patellar reflexes are 2+ on the right side and 2+ on the left side. Achilles reflexes are 2+ on the right side and 2+ on the left side. Psychiatric:         Mood and Affect: Mood normal.         Speech: Speech normal.         Behavior: Behavior normal.         Thought Content: Thought content normal.         Judgment: Judgment normal.        Neurologic Exam     Mental Status   Oriented to person, place, and time. Concentration: normal.   Speech: speech is normal   Level of consciousness: alert  Knowledge: good. Normal comprehension. Provided clear history, memory capacity was normal, no dysphasia. Cranial Nerves     CN II   Visual fields full to confrontation.    Visual acuity: normal  Right visual field deficit: none  Left visual field deficit: none     CN III, IV, VI   Pupils are equal, round, and reactive to light. Extraocular motions are normal.   Right pupil: Size: 3 mm. Shape: regular. Reactivity: brisk. Left pupil: Size: 3 mm. Shape: regular. Reactivity: brisk. Nystagmus: none   Diplopia: none  Ophthalmoparesis: none  Upgaze: normal    CN V   Facial sensation intact. CN VII   Facial expression full, symmetric. CN VIII   CN VIII normal.     CN IX, X   CN IX normal.   CN X normal.     CN XI   CN XI normal.     CN XII   CN XII normal.     No color desaturation (red). Optokynetic nystagmus present. Motor Exam   Muscle bulk: normal  Overall muscle tone: normal  Right arm pronator drift: absent  Left arm pronator drift: absent    Strength   Strength 5/5 throughout. Right handed, finger tapping normal     Sensory Exam   Light touch normal.   Vibration normal.   Pinprick normal.     Gait, Coordination, and Reflexes     Gait  Gait: normal    Coordination   Romberg: negative  Finger to nose coordination: normal  Heel to shin coordination: normal    Tremor   Resting tremor: absent  Intention tremor: absent  Action tremor: absent    Reflexes   Right brachioradialis: 2+  Left brachioradialis: 2+  Right biceps: 2+  Left biceps: 2+  Right triceps: 2+  Left triceps: 2+  Right patellar: 2+  Left patellar: 2+  Right achilles: 2+  Left achilles: 2+  Right plantar: normal  Left plantar: normal  Right Keys: absent  Left Keys: absent      Assessment / Plan:    [place] was seen today for follow-up and multiple sclerosis. Diagnoses and all orders for this visit:    [facility]) ([facility]  -      [address]; Future  -     MRI THORACIC SPINE W WO CONTRAST; Future  -     External Referral To Neurology  -     MRI BRAIN W CONTRAST; Future  -     HIV 1/2 Ag/Ab, 4TH Generation,W Rflx Confirm; Future  -     Hepatitis B Core Antibody, Total; Future  -     IgG, IgA, IgM;  Future  - Sedimentation Rate; Future  -     Miscellaneous Sendout; Future  -     Hepatic Function Panel; Future  -     Varicella Zoster Antibody, IgG; Future    Migraine without aura and without status migrainosus, not intractable  -     venlafaxine (EFFEXOR XR) 37.5 MG extended release capsule; Take 1 capsule by mouth in the morning.  -     rizatriptan (MAXALT) 10 MG tablet; Take 1 tablet by mouth once as needed for Migraine May repeat in 2 hours if needed, max 2/24h. Patient returned for follow-up, we have discussed incidental findings on brain MRI done for migraine headaches, discussed the CSF results which carried several atypical findings including protein 125, 0 oligoband. Patient recalled that she has intermittent tingling sensation running down to the spine when bending the neck suggestive of Lhermitte's sign past 3 years and has unexplainable fatigue. She will complete above labs, spine MRI and brain MRI with contrast, re: there was a shadow within the left lateral ventricle. Obtain secondary opinion from jorge Cramer  specialist to finalize the new diagnosis of multiple sclerosis, and then we will start treatment sooner. I have spent 60 min, greater than 50% of discussing and counseling with patient, for treatment and diagnostic plan review.

## 2022-07-21 DIAGNOSIS — G35 MS (MULTIPLE SCLEROSIS) (HCC): Primary | ICD-10-CM

## 2022-07-21 RX ORDER — ALPRAZOLAM 1 MG/1
TABLET ORAL
Qty: 2 TABLET | Refills: 0 | Status: SHIPPED | OUTPATIENT
Start: 2022-07-21 | End: 2022-07-28

## 2022-07-21 NOTE — TELEPHONE ENCOUNTER
Patient is having 2 MRI's done, one on 8/2/22 and one on 8/5/22 and she will need a medication called in since she is claustrophobic. Please send enough for both MRI's.

## 2022-08-02 ENCOUNTER — TELEMEDICINE (OUTPATIENT)
Dept: NEUROLOGY | Age: 37
End: 2022-08-02
Payer: OTHER GOVERNMENT

## 2022-08-02 ENCOUNTER — HOSPITAL ENCOUNTER (OUTPATIENT)
Dept: MRI IMAGING | Age: 37
Discharge: HOME | End: 2022-08-05
Payer: OTHER GOVERNMENT

## 2022-08-02 DIAGNOSIS — G35 MS (MULTIPLE SCLEROSIS) (HCC): ICD-10-CM

## 2022-08-02 DIAGNOSIS — G35 MULTIPLE SCLEROSIS, RELAPSING-REMITTING (HCC): Primary | ICD-10-CM

## 2022-08-02 PROCEDURE — 99214 OFFICE O/P EST MOD 30 MIN: CPT | Performed by: PSYCHIATRY & NEUROLOGY

## 2022-08-02 PROCEDURE — 72156 MRI NECK SPINE W/O & W/DYE: CPT

## 2022-08-02 PROCEDURE — 6360000004 HC RX CONTRAST MEDICATION: Performed by: PSYCHIATRY & NEUROLOGY

## 2022-08-02 PROCEDURE — 72157 MRI CHEST SPINE W/O & W/DYE: CPT

## 2022-08-02 PROCEDURE — A9579 GAD-BASE MR CONTRAST NOS,1ML: HCPCS | Performed by: PSYCHIATRY & NEUROLOGY

## 2022-08-02 RX ORDER — SODIUM CHLORIDE 9 MG/ML
INJECTION, SOLUTION INTRAVENOUS CONTINUOUS
Status: CANCELLED | OUTPATIENT
Start: 2022-08-02

## 2022-08-02 RX ADMIN — GADOTERIDOL 17 ML: 279.3 INJECTION, SOLUTION INTRAVENOUS at 08:50

## 2022-08-02 NOTE — PROGRESS NOTES
Mike Casas (:  1985) is a Established patient, here for evaluation of the following:    Assessment & Plan   Below is the assessment and plan developed based on review of pertinent history, physical exam, labs, studies, and medications. 1. Multiple sclerosis, relapsing-remitting (HCC)  Although CSF findings were atypical, MRI imaging results are quite convincing of MS relapse remitting type. Complete contrast brain MRI then start Solu-Medrol 1 g daily x4 days. Await for lab results to determine which disease modifying agent would be appropriate choice. She was encouraged to have a secondary opinion from Perez Jackman specialist at [address], appointment was set up in December. Return in about 3 months (around 2022). Subjective   Left arm numbness for couple of months, tingling sensation down to the spine when bending more than a year. No hx infection or injury. Noticed numbness of both legs waist below one time when walking shopping market. No weakness. Bladder- urgency, frequency, chronic past 6 years after giving birth to her son. She has one child. Had hysterectomy. Labs just done, pending results. MRI brain with contrast pending. Negative oligo bands, significantly increased albumin level and albumin index. IgG synthesis rate slightly increased. ACE negative. White cell 4, protein 125. CS MRI pictures were reviewed, c/w MS lesions rather than tumor. TS neg. Impression   1. Multiple cervical cord lesions demonstrate mild enhancement concerning for   active demyelinating lesions possibly related to multiple sclerosis. Multiple signal abnormalities are seen of the cervical cord. This is most   evident in the right lateral cord at the C4 level best appreciated on sagittal   STIR image 6. However, multiple additional cord lesions are suggested at the   right dorsal C2-3 disc level, right dorsal lateral C3 level, right lateral C4   level and dorsal midline at the C5-6 disc level.  The cord is minimally expanded   at these levels as well. The appearance raises concern for cord lesions. Faint   enhancement is seen in the C2-3, C4, and C5-6 lesions described above raising   concern for areas of potential active demyelination. No abnormal cord signal and caliber changes are seen of the thoracic cord to   suggest an additional thoracic cord lesion. No abnormal enhancement is seen. Review of Systems   Genitourinary:  Positive for frequency and urgency. Neurological:  Positive for tingling and sensory change. Objective   Patient-Reported Vitals  No data recorded     Physical Exam  [INSTRUCTIONS:  \"[x]\" Indicates a positive item  \"[]\" Indicates a negative item  -- DELETE ALL ITEMS NOT EXAMINED]    Constitutional: [x] Appears well-developed and well-nourished [x] No apparent distress      [] Abnormal -     Mental status: [x] Alert and awake  [x] Oriented to person/place/time [x] Able to follow commands. Provided clear history, memory capacity was normal, no dysphasia.      [] Abnormal -     Eyes:   EOM    [x]  Normal    [] Abnormal -   Sclera  [x]  Normal    [] Abnormal -          Discharge [x]  None visible   [] Abnormal -     HENT: [x] Normocephalic, atraumatic  [] Abnormal -   [x]     External Ears [x] Normal hearing [] Abnormal -    Neck: [x]  [] Abnormal -     Pulmonary/Chest: [x] Respiratory effort normal   [x] No visualized signs of difficulty breathing or respiratory distress        [] Abnormal -      Musculoskeletal:   [x] Normal gait with no signs of ataxia         [x] Normal range of motion of neck        [] Abnormal -     Neurological:        [x] No Facial Asymmetry (Cranial nerve 7 motor function) (limited exam due to video visit)          [x] No gaze palsy        [] Abnormal -          Skin:        [x]          [] Abnormal -            Psychiatric:       [x] Normal Affect [] Abnormal -        [x] No Hallucinations    Other pertinent observable physical exam findings:-         On this date 8/2/2022 I have spent 35 minutes reviewing previous notes, test results and face to face (virtual) with the patient discussing the diagnosis and importance of compliance with the treatment plan as well as documenting on the day of the visit. Vivian Martinez, was evaluated through a synchronous (real-time) audio-video encounter. The patient (or guardian if applicable) is aware that this is a billable service, which includes applicable co-pays. This Virtual Visit was conducted with patient's (and/or legal guardian's) consent. The visit was conducted pursuant to the emergency declaration under the [facility] authority and the [employer] and [employer] Act. Patient identification was verified, and a caregiver was present when appropriate. The patient was located at Home: [address] [place] [address] [address] [address]. Provider was located at [place] [address] ([address] [place] [address]): [address].         --Sara Vigil MD

## 2022-08-05 ENCOUNTER — HOSPITAL ENCOUNTER (OUTPATIENT)
Dept: MRI IMAGING | Age: 37
Discharge: HOME | End: 2022-08-08
Payer: OTHER GOVERNMENT

## 2022-08-05 DIAGNOSIS — G35 MS (MULTIPLE SCLEROSIS) (HCC): ICD-10-CM

## 2022-08-05 PROCEDURE — 2580000003 HC RX 258: Performed by: PSYCHIATRY & NEUROLOGY

## 2022-08-05 PROCEDURE — A9579 GAD-BASE MR CONTRAST NOS,1ML: HCPCS | Performed by: PSYCHIATRY & NEUROLOGY

## 2022-08-05 PROCEDURE — 6360000004 HC RX CONTRAST MEDICATION: Performed by: PSYCHIATRY & NEUROLOGY

## 2022-08-05 PROCEDURE — 70553 MRI BRAIN STEM W/O & W/DYE: CPT

## 2022-08-05 RX ORDER — SODIUM CHLORIDE 0.9 % (FLUSH) 0.9 %
10 SYRINGE (ML) INJECTION AS NEEDED
Status: DISCONTINUED | OUTPATIENT
Start: 2022-08-05 | End: 2022-08-09 | Stop reason: HOSPADM

## 2022-08-05 RX ADMIN — GADOTERIDOL 16 ML: 279.3 INJECTION, SOLUTION INTRAVENOUS at 08:16

## 2022-08-05 RX ADMIN — SODIUM CHLORIDE, PRESERVATIVE FREE 10 ML: 5 INJECTION INTRAVENOUS at 08:16

## 2022-08-11 DIAGNOSIS — G35 MULTIPLE SCLEROSIS, RELAPSING-REMITTING (HCC): Primary | ICD-10-CM

## 2022-08-11 RX ORDER — METHYLPREDNISOLONE SODIUM SUCCINATE 1 G/16ML
1000 INJECTION, POWDER, LYOPHILIZED, FOR SOLUTION INTRAMUSCULAR; INTRAVENOUS DAILY
Qty: 4 EACH | Refills: 0 | Status: SHIPPED | OUTPATIENT
Start: 2022-08-12 | End: 2022-08-11 | Stop reason: SDUPTHER

## 2022-08-11 RX ORDER — METHYLPREDNISOLONE SODIUM SUCCINATE 1 G/16ML
1000 INJECTION, POWDER, LYOPHILIZED, FOR SOLUTION INTRAMUSCULAR; INTRAVENOUS DAILY
Qty: 4 EACH | Refills: 0 | Status: SHIPPED | OUTPATIENT
Start: 2022-08-12 | End: 2022-08-16

## 2022-08-11 NOTE — PROGRESS NOTES
Requested Prescriptions     Signed Prescriptions Disp Refills    methylPREDNISolone sodium (SOLU-MEDROL) 1000 MG injection 4 each 0     Sig: Infuse 16 mLs intravenously in the morning for 4 days. All labs orders neg. Good to start ocrevus. Plan to repeat Lp in one year due to atypical findings.

## 2022-08-22 ENCOUNTER — HOSPITAL ENCOUNTER (OUTPATIENT)
Dept: INFUSION THERAPY | Age: 37
Discharge: HOME | End: 2022-08-22
Payer: OTHER GOVERNMENT

## 2022-08-22 VITALS
HEART RATE: 82 BPM | BODY MASS INDEX: 29.41 KG/M2 | RESPIRATION RATE: 18 BRPM | SYSTOLIC BLOOD PRESSURE: 126 MMHG | WEIGHT: 182.2 LBS | OXYGEN SATURATION: 98 % | TEMPERATURE: 98.5 F | DIASTOLIC BLOOD PRESSURE: 78 MMHG

## 2022-08-22 DIAGNOSIS — G35 MULTIPLE SCLEROSIS, RELAPSING-REMITTING (HCC): ICD-10-CM

## 2022-08-22 PROCEDURE — 6360000002 HC RX W HCPCS: Performed by: PSYCHIATRY & NEUROLOGY

## 2022-08-22 PROCEDURE — 2580000003 HC RX 258: Performed by: PSYCHIATRY & NEUROLOGY

## 2022-08-22 PROCEDURE — 96365 THER/PROPH/DIAG IV INF INIT: CPT

## 2022-08-22 RX ORDER — SODIUM CHLORIDE 0.9 % (FLUSH) 0.9 %
10 SYRINGE (ML) INJECTION PRN
Status: DISCONTINUED | OUTPATIENT
Start: 2022-08-22 | End: 2022-08-23 | Stop reason: HOSPADM

## 2022-08-22 RX ADMIN — SODIUM CHLORIDE, PRESERVATIVE FREE 10 ML: 5 INJECTION INTRAVENOUS at 08:30

## 2022-08-22 RX ADMIN — METHYLPREDNISOLONE SODIUM SUCCINATE 1000 MG: 1 INJECTION, POWDER, LYOPHILIZED, FOR SOLUTION INTRAMUSCULAR; INTRAVENOUS at 08:58

## 2022-08-22 RX ADMIN — SODIUM CHLORIDE, PRESERVATIVE FREE 10 ML: 5 INJECTION INTRAVENOUS at 10:00

## 2022-08-22 NOTE — PROGRESS NOTES
Arrived to the [facility]. Solumedrol infusion completed. Patient tolerated well. Any issues or concerns during appointment: none. Patient aware of next infusion appointment on 8/23 at 8:30 am.  Discharged ambulatory to home.

## 2022-08-23 ENCOUNTER — HOSPITAL ENCOUNTER (OUTPATIENT)
Dept: INFUSION THERAPY | Age: 37
Discharge: HOME | End: 2022-08-23
Payer: OTHER GOVERNMENT

## 2022-08-23 VITALS
SYSTOLIC BLOOD PRESSURE: 128 MMHG | TEMPERATURE: 98.1 F | OXYGEN SATURATION: 96 % | DIASTOLIC BLOOD PRESSURE: 76 MMHG | RESPIRATION RATE: 18 BRPM | HEART RATE: 78 BPM

## 2022-08-23 PROCEDURE — 6360000002 HC RX W HCPCS: Performed by: PSYCHIATRY & NEUROLOGY

## 2022-08-23 PROCEDURE — 96365 THER/PROPH/DIAG IV INF INIT: CPT

## 2022-08-23 PROCEDURE — 2580000003 HC RX 258: Performed by: PSYCHIATRY & NEUROLOGY

## 2022-08-23 RX ORDER — SODIUM CHLORIDE 0.9 % (FLUSH) 0.9 %
10 SYRINGE (ML) INJECTION PRN
Status: DISCONTINUED | OUTPATIENT
Start: 2022-08-23 | End: 2022-08-24 | Stop reason: HOSPADM

## 2022-08-23 RX ADMIN — METHYLPREDNISOLONE SODIUM SUCCINATE 1000 MG: 1 INJECTION, POWDER, LYOPHILIZED, FOR SOLUTION INTRAMUSCULAR; INTRAVENOUS at 08:48

## 2022-08-23 RX ADMIN — SODIUM CHLORIDE, PRESERVATIVE FREE 10 ML: 5 INJECTION INTRAVENOUS at 08:34

## 2022-08-23 NOTE — PLAN OF CARE
Problem: Pain  Goal: Verbalizes/displays adequate comfort level or baseline comfort level  Outcome: Progressing     Problem: Neurosensory - Adult  Goal: Achieves stable or improved neurological status  Outcome: Progressing  Goal: Achieves maximal functionality and self care  Outcome: Progressing     Problem: Safety - Adult  Goal: Free from fall injury  Outcome: Progressing

## 2022-08-23 NOTE — PROGRESS NOTES
[facility] Solumedrol infusion completed. Patient tolerated well. Any issues or concerns during appointment: none. Patient aware of next infusion appointment on 8/24 at 10:30 am.  Discharged ambulatory to home.

## 2022-08-24 ENCOUNTER — HOSPITAL ENCOUNTER (OUTPATIENT)
Dept: INFUSION THERAPY | Age: 37
Discharge: HOME | End: 2022-08-24
Payer: OTHER GOVERNMENT

## 2022-08-24 VITALS
TEMPERATURE: 98.1 F | OXYGEN SATURATION: 99 % | SYSTOLIC BLOOD PRESSURE: 151 MMHG | DIASTOLIC BLOOD PRESSURE: 75 MMHG | HEART RATE: 81 BPM

## 2022-08-24 PROCEDURE — 6360000002 HC RX W HCPCS: Performed by: PSYCHIATRY & NEUROLOGY

## 2022-08-24 PROCEDURE — 2580000003 HC RX 258: Performed by: PSYCHIATRY & NEUROLOGY

## 2022-08-24 PROCEDURE — 96365 THER/PROPH/DIAG IV INF INIT: CPT

## 2022-08-24 RX ORDER — SODIUM CHLORIDE 0.9 % (FLUSH) 0.9 %
10 SYRINGE (ML) INJECTION PRN
Status: DISCONTINUED | OUTPATIENT
Start: 2022-08-24 | End: 2022-08-25 | Stop reason: HOSPADM

## 2022-08-24 RX ADMIN — METHYLPREDNISOLONE SODIUM SUCCINATE 1000 MG: 1 INJECTION, POWDER, LYOPHILIZED, FOR SOLUTION INTRAMUSCULAR; INTRAVENOUS at 10:35

## 2022-08-24 RX ADMIN — SODIUM CHLORIDE, PRESERVATIVE FREE 10 ML: 5 INJECTION INTRAVENOUS at 11:36

## 2022-08-24 NOTE — PROGRESS NOTES
Arrived to the [facility]. Solu-Medrol completed. Patient tolerated well. Any issues or concerns during appointment: none. Patient aware of next infusion appointment on 8/25. Patient instructed to call provider with temperature of 100.4 or greater or nausea/vomiting/ diarrhea or pain not controlled by medications. Discharged ambulatory with PIV in place for infusion tomorrow.

## 2022-08-25 ENCOUNTER — HOSPITAL ENCOUNTER (OUTPATIENT)
Dept: INFUSION THERAPY | Age: 37
Discharge: HOME | End: 2022-08-25
Payer: OTHER GOVERNMENT

## 2022-08-25 VITALS
DIASTOLIC BLOOD PRESSURE: 62 MMHG | OXYGEN SATURATION: 100 % | TEMPERATURE: 98.6 F | RESPIRATION RATE: 18 BRPM | HEART RATE: 73 BPM | SYSTOLIC BLOOD PRESSURE: 111 MMHG

## 2022-08-25 PROCEDURE — 96365 THER/PROPH/DIAG IV INF INIT: CPT

## 2022-08-25 PROCEDURE — 6360000002 HC RX W HCPCS: Performed by: PSYCHIATRY & NEUROLOGY

## 2022-08-25 PROCEDURE — 2580000003 HC RX 258: Performed by: PSYCHIATRY & NEUROLOGY

## 2022-08-25 RX ORDER — SODIUM CHLORIDE 0.9 % (FLUSH) 0.9 %
5-40 SYRINGE (ML) INJECTION PRN
Status: DISCONTINUED | OUTPATIENT
Start: 2022-08-25 | End: 2022-08-26 | Stop reason: HOSPADM

## 2022-08-25 RX ADMIN — METHYLPREDNISOLONE SODIUM SUCCINATE 1000 MG: 1 INJECTION, POWDER, LYOPHILIZED, FOR SOLUTION INTRAMUSCULAR; INTRAVENOUS at 10:20

## 2022-08-25 RX ADMIN — SODIUM CHLORIDE, PRESERVATIVE FREE 10 ML: 5 INJECTION INTRAVENOUS at 10:07

## 2022-08-26 ENCOUNTER — TELEPHONE (OUTPATIENT)
Dept: NEUROLOGY | Age: 37
End: 2022-08-26

## 2022-08-26 DIAGNOSIS — G43.009 MIGRAINE WITHOUT AURA AND WITHOUT STATUS MIGRAINOSUS, NOT INTRACTABLE: ICD-10-CM

## 2022-08-26 DIAGNOSIS — G35 MULTIPLE SCLEROSIS, RELAPSING-REMITTING (HCC): Primary | ICD-10-CM

## 2022-08-26 RX ORDER — ATOGEPANT 30 MG/1
30 TABLET ORAL DAILY
Qty: 30 TABLET | Refills: 11 | Status: SHIPPED | OUTPATIENT
Start: 2022-08-26

## 2022-08-26 RX ORDER — GLATIRAMER 40 MG/ML
40 INJECTION, SOLUTION SUBCUTANEOUS
Qty: 12 ML | Refills: 11 | Status: SHIPPED | OUTPATIENT
Start: 2022-08-26

## 2022-08-26 RX ORDER — GLATIRAMER 40 MG/ML
40 INJECTION, SOLUTION SUBCUTANEOUS
Qty: 12 ML | Refills: 11 | Status: SHIPPED | OUTPATIENT
Start: 2022-08-26 | End: 2022-08-26

## 2022-08-26 NOTE — PROGRESS NOTES
Requested Prescriptions     Signed Prescriptions Disp Refills    glatiramer (COPAXONE) 40 MG/ML injection 12 mL 11     Sig: Inject 1 mL into the skin three times a week

## 2022-09-19 NOTE — PROGRESS NOTES
Arrived to the [facility] ambulatory. Solumedrol infusion completed. Patient tolerated well. Any issues or concerns during appointment: none. Patient aware that she has no future infusion appointments scheduled with us. Patient instructed to call provider with temperature of 100.4 or greater or nausea/vomiting/ diarrhea or pain not controlled by medications  Discharged ambulatory. Pt called requesting a refill for Diflucan (2nd dose) and BC Loryna to be sent to   [address]  [address] [facility] [address] [place] [address]

## 2022-11-07 ENCOUNTER — OFFICE VISIT (OUTPATIENT)
Dept: NEUROLOGY | Age: 37
End: 2022-11-07
Payer: OTHER GOVERNMENT

## 2022-11-07 VITALS
HEART RATE: 115 BPM | SYSTOLIC BLOOD PRESSURE: 144 MMHG | HEIGHT: 66 IN | DIASTOLIC BLOOD PRESSURE: 94 MMHG | WEIGHT: 176 LBS | BODY MASS INDEX: 28.28 KG/M2

## 2022-11-07 DIAGNOSIS — G43.009 MIGRAINE WITHOUT AURA AND WITHOUT STATUS MIGRAINOSUS, NOT INTRACTABLE: ICD-10-CM

## 2022-11-07 DIAGNOSIS — G35 MULTIPLE SCLEROSIS, RELAPSING-REMITTING (HCC): Primary | ICD-10-CM

## 2022-11-07 PROCEDURE — 99215 OFFICE O/P EST HI 40 MIN: CPT | Performed by: PSYCHIATRY & NEUROLOGY

## 2022-11-07 RX ORDER — ATOGEPANT 60 MG/1
60 TABLET ORAL DAILY
Qty: 90 TABLET | Refills: 3 | Status: SHIPPED | OUTPATIENT
Start: 2022-11-07 | End: 2023-11-07

## 2022-11-07 ASSESSMENT — ENCOUNTER SYMPTOMS
GASTROINTESTINAL NEGATIVE: 1
EYES NEGATIVE: 1
RESPIRATORY NEGATIVE: 1

## 2022-11-07 ASSESSMENT — VISUAL ACUITY: VA_NORMAL: 1

## 2022-11-07 NOTE — PROGRESS NOTES
11/7/2022  Ana Cueva 44 y.o. female      Chief Complaint:  Chief Complaint   Patient presents with    Multiple Sclerosis          Followup Note:   Left arm tingling went away thought responded to qulipta. Copaxone gave her injection site reaction, red, swelling for 2 days, burning pain in the site. Stopped. Await for [address] MS specialist consultation-  unusual CSF high protein 129, 0 oligoband. MRI brain and CS T2 signal changes, stroke work up not indicated given this pattern. Labs done and ready for MS disease modifying agent. Migraine 2/week, yesterday migraine had to stay in bed, frequency has reduced with qulipta. Sleeps well. Energy level ok range, currently moving to another house in town. Vision normal. No COVID infection. FH neg. Previous notes -  7/20/22  Followup Note:   Past few years tingling sensation from neck all way down sometimes extending to legs when she bended her neck. No pain. Recently left arm numbness lasting few minutes. No cognitive dysfunction, no depression or anxiety, sleeps well. Fatigued past 2-3 years, constantly fatigued. No hx of infectious disease including Lyme, HIV. Migraine 10 years, once a week. Duration 3 hours to a day, up to 7 days. Asso with visual disturbance. Tried fioricet not helping. No preventives in the past. No HTN or heart problem, no kidney stone or glaucoma, no asthma. 6/14/22  Subjective   MRI was done for chronic migraine on 3/16/2022, showing T2 signal changes suggested MS. Migraine has reduced intensity and frequency, 3-4 per month. Denied visual loss, imbalance or weakness, has occasional numbness in tips of fingers bilaterally. Sometimes fatigue, good energy most of time. No other medical issues. FH no autoimmune disease of MS. Had hysterectomy. Previous test results review -  Reviewed MRI pictures with pt, T2 signal changes perpendicular to vents, 2 in lower pontine, strongly suggest MS.   labs:  I have reviewed imaging study and lab tests, discussed results with patient in detail. CSF finding showed increased albumin index, mildly increased IgG synthesis rate, oligo bands 0, protein 129, wbc 4. VDRL and ACE neg. Review Test Results: I have reviewed imaging study and lab tests, discussed results with patient in detail. 8/2/22 CS MRI   Impression   1. Multiple cervical cord lesions demonstrate mild enhancement concerning for   active demyelinating lesions possibly related to multiple sclerosis. Thoracic spine neg.         8/5/22 brain MRI   1. Stable pericallosal and periventricular white matter lesions in keeping with   history of multiple sclerosis, with one punctate area of enhancement in the   anterior right frontal lobe, and 3 vague areas of restricted diffusion along   both frontal convexities suggestive of mildly active plaque. Clinical   correlation is recommended. Current Outpatient Medications   Medication Sig Dispense Refill    Atogepant (QULIPTA) 60 MG TABS Take 60 mg by mouth daily 90 tablet 3    glatiramer (COPAXONE) 40 MG/ML injection Inject 1 mL into the skin three times a week (Patient not taking: Reported on 11/7/2022) 12 mL 11    venlafaxine (EFFEXOR XR) 37.5 MG extended release capsule Take 1 capsule by mouth in the morning. (Patient not taking: Reported on 11/7/2022) 30 capsule 3    rizatriptan (MAXALT) 10 MG tablet Take 1 tablet by mouth once as needed for Migraine May repeat in 2 hours if needed, max 2/24h. (Patient not taking: Reported on 11/7/2022) 10 tablet 3     No current facility-administered medications for this visit. Allergies   Allergen Reactions    Fish-Derived Products          Review of Systems:  Review of Systems   Constitutional: Negative. HENT: Negative. Eyes: Negative. Respiratory: Negative. Cardiovascular: Negative. Gastrointestinal: Negative. Genitourinary: Negative. Musculoskeletal: Negative. Skin: Negative.     Neurological:  Positive for tremors and headaches. Endo/Heme/Allergies: Negative. Psychiatric/Behavioral: Negative. Examination:  Vitals:    11/07/22 1528   BP: (!) 144/94   Site: Left Upper Arm   Position: Sitting   Pulse: (!) 115   Weight: 176 lb (79.8 kg)   Height: 5' 6\" (1.676 m)        Physical Exam  Eyes:      Extraocular Movements: EOM normal.      Pupils: Pupils are equal, round, and reactive to light. Neurological:      Mental Status: She is oriented to person, place, and time. Motor: Motor strength is normal.      Coordination: Finger-Nose-Finger Test and Romberg Test normal.      Gait: Gait is intact. Tandem walk normal.   Psychiatric:         Speech: Speech normal.        Neurologic Exam     Mental Status   Oriented to person, place, and time. Concentration: normal.   Speech: speech is normal   Level of consciousness: alert  Knowledge: good. Normal comprehension. Cranial Nerves     CN II   Visual fields full to confrontation. Visual acuity: normal  Right visual field deficit: none  Left visual field deficit: none     CN III, IV, VI   Pupils are equal, round, and reactive to light. Extraocular motions are normal.   Right pupil: Size: 3 mm. Shape: regular. Left pupil: Size: 3 mm. Shape: regular. Nystagmus: none   Diplopia: none  Ophthalmoparesis: none  Upgaze: normal    CN V   Facial sensation intact. CN VII   Facial expression full, symmetric. CN VIII   CN VIII normal.     CN IX, X   CN IX normal.   CN X normal.     CN XI   CN XI normal.     CN XII   CN XII normal.     Motor Exam   Muscle bulk: normal  Overall muscle tone: normal  Right arm pronator drift: absent  Left arm pronator drift: absent    Strength   Strength 5/5 throughout.      Sensory Exam   Light touch normal.     Gait, Coordination, and Reflexes     Gait  Gait: normal    Coordination   Romberg: negative  Finger to nose coordination: normal  Tandem walking coordination: normal    Tremor   Resting tremor: absent  Intention tremor: absent  Action tremor: absent      Assessment / Plan:    Janice Pérez was seen today for multiple sclerosis. Diagnoses and all orders for this visit:    Multiple sclerosis, relapsing-remitting ([facility] [address]  -     CBC with Auto Differential; Future  Check JCV    Migraine without aura and without status migrainosus, not intractable  -     Atogepant (QULIPTA) 60 MG TABS; Take 60 mg by mouth daily     Reviewed symptoms and test results, consistent with MS. Some unusual findings including high CSF protein 129, 0 oligoband. While waiting for MS specialist input, she tried copaxone and developed intolerable injection site reaction. She is ready to start DMA, the choice for this patient without comorbid condition likely broad. Had hysterectomy. I have spent 35 min, greater than 50% of discussing and counseling with patient, for treatment and diagnostic plan review.

## 2022-12-20 ENCOUNTER — OFFICE VISIT (OUTPATIENT)
Dept: FAMILY MEDICINE CLINIC | Facility: CLINIC | Age: 37
End: 2022-12-20
Payer: OTHER GOVERNMENT

## 2022-12-20 VITALS
OXYGEN SATURATION: 99 % | HEART RATE: 106 BPM | BODY MASS INDEX: 27.8 KG/M2 | WEIGHT: 173 LBS | SYSTOLIC BLOOD PRESSURE: 124 MMHG | HEIGHT: 66 IN | TEMPERATURE: 97.6 F | DIASTOLIC BLOOD PRESSURE: 78 MMHG

## 2022-12-20 DIAGNOSIS — H65.93 ALLERGIC OTITIS MEDIA OF BOTH EARS, UNSPECIFIED CHRONICITY: Primary | ICD-10-CM

## 2022-12-20 DIAGNOSIS — J01.90 ACUTE BACTERIAL SINUSITIS: ICD-10-CM

## 2022-12-20 DIAGNOSIS — B96.89 ACUTE BACTERIAL SINUSITIS: ICD-10-CM

## 2022-12-20 PROCEDURE — 96372 THER/PROPH/DIAG INJ SC/IM: CPT | Performed by: FAMILY MEDICINE

## 2022-12-20 PROCEDURE — 99213 OFFICE O/P EST LOW 20 MIN: CPT | Performed by: FAMILY MEDICINE

## 2022-12-20 RX ORDER — METHYLPREDNISOLONE 4 MG/1
TABLET ORAL
Qty: 1 KIT | Refills: 0 | Status: SHIPPED | OUTPATIENT
Start: 2022-12-20 | End: 2022-12-26

## 2022-12-20 RX ORDER — CEFTRIAXONE 1 G/1
1000 INJECTION, POWDER, FOR SOLUTION INTRAMUSCULAR; INTRAVENOUS ONCE
Status: COMPLETED | OUTPATIENT
Start: 2022-12-20 | End: 2022-12-20

## 2022-12-20 RX ORDER — CEFDINIR 300 MG/1
300 CAPSULE ORAL 2 TIMES DAILY
Qty: 20 CAPSULE | Refills: 0 | Status: SHIPPED | OUTPATIENT
Start: 2022-12-20 | End: 2022-12-30

## 2022-12-20 RX ADMIN — CEFTRIAXONE 1000 MG: 1 INJECTION, POWDER, FOR SOLUTION INTRAMUSCULAR; INTRAVENOUS at 08:26

## 2022-12-22 RX ORDER — METHYLPREDNISOLONE 4 MG/1
TABLET ORAL
Qty: 1 KIT | Refills: 0 | Status: SHIPPED | OUTPATIENT
Start: 2022-12-22 | End: 2022-12-26

## 2022-12-22 RX ORDER — CEFDINIR 300 MG/1
300 CAPSULE ORAL 2 TIMES DAILY
Qty: 20 CAPSULE | Refills: 0 | Status: SHIPPED | OUTPATIENT
Start: 2022-12-22 | End: 2023-01-01

## 2022-12-22 ASSESSMENT — ENCOUNTER SYMPTOMS
GASTROINTESTINAL NEGATIVE: 1
COUGH: 1
EYES NEGATIVE: 1
RHINORRHEA: 1

## 2022-12-23 NOTE — PROGRESS NOTES
HISTORY OF PRESENT ILLNESS  David Hugo is a 44 y.o. y.o. female    Ear Drainage   There is pain in both ears. This is a new problem. The current episode started in the past 7 days. The problem occurs constantly. The problem has been gradually worsening. The maximum temperature recorded prior to her arrival was 100.4 - 100.9 F. The pain is moderate. Associated symptoms include coughing, headaches, hearing loss and rhinorrhea. Sinusitis  This is a new problem. The current episode started in the past 7 days. The problem has been gradually worsening since onset. Associated symptoms include coughing and headaches. Allergies   Allergen Reactions    Fish-Derived Products         Current Outpatient Medications   Medication Sig    cefdinir (OMNICEF) 300 MG capsule Take 1 capsule by mouth 2 times daily for 10 days    methylPREDNISolone (MEDROL DOSEPACK) 4 MG tablet Take by mouth.    methylPREDNISolone (MEDROL DOSEPACK) 4 MG tablet Take by mouth. cefdinir (OMNICEF) 300 MG capsule Take 1 capsule by mouth 2 times daily for 10 days    Atogepant (QULIPTA) 60 MG TABS Take 60 mg by mouth daily    glatiramer (COPAXONE) 40 MG/ML injection Inject 1 mL into the skin three times a week (Patient not taking: Reported on 11/7/2022)    venlafaxine (EFFEXOR XR) 37.5 MG extended release capsule Take 1 capsule by mouth in the morning. (Patient not taking: Reported on 11/7/2022)    rizatriptan (MAXALT) 10 MG tablet Take 1 tablet by mouth once as needed for Migraine May repeat in 2 hours if needed, max 2/24h. (Patient not taking: Reported on 11/7/2022)     No current facility-administered medications for this visit.         Past Medical History:   Diagnosis Date    Abnormal Papanicolaou smear of cervix 2021    ASCUS HPV Positive    Anxiety     Depression     Former smoker     Migraines     Nausea & vomiting     post-op N/V        Past Surgical History:   Procedure Laterality Date    BILATERAL SALPINGOOPHORECTOMY  04/2022 HYSTERECTOMY (CERVIX STATUS UNKNOWN)      LAPAROSCOPIC HYSTERECTOMY  2022    HYSTERECTOMY VAGINAL ASSISTED LAPAROSCOPIC (LAVH) BILATERAL SALPINGECTOMY    TLH AND BSO (CERVIX REMOVED)  2022    TONSILLECTOMY      TUBAL LIGATION          Social History     Socioeconomic History    Marital status:      Spouse name: Not on file    Number of children: Not on file    Years of education: Not on file    Highest education level: Not on file   Occupational History    Not on file   Tobacco Use    Smoking status: Former     Packs/day: 0.25     Types: Cigarettes     Quit date: 1/10/2022     Years since quittin.9    Smokeless tobacco: Never   Substance and Sexual Activity    Alcohol use: Not Currently    Drug use: Never    Sexual activity: Not on file     Comment: Hysterectomy 21   Other Topics Concern    Not on file   Social History Narrative    Not on file     Social Determinants of Health     Financial Resource Strain: Not on file   Food Insecurity: Not on file   Transportation Needs: Not on file   Physical Activity: Not on file   Stress: Not on file   Social Connections: Not on file   Intimate Partner Violence: Not on file   Housing Stability: Not on file        Review of Systems   Constitutional: Negative. HENT:  Positive for hearing loss and rhinorrhea. Eyes: Negative. Respiratory:  Positive for cough. Cardiovascular: Negative. Gastrointestinal: Negative. Endocrine: Negative. Genitourinary: Negative. Musculoskeletal: Negative. Skin: Negative. Neurological:  Positive for headaches. Psychiatric/Behavioral: Negative. /78   Pulse (!) 106   Temp 97.6 °F (36.4 °C) (Temporal)   Ht 5' 6\" (1.676 m)   Wt 173 lb (78.5 kg)   SpO2 99%   BMI 27.92 kg/m²      Physical Exam  Constitutional:       Appearance: Normal appearance. HENT:      Head: Normocephalic.       Right Ear: Tympanic membrane, ear canal and external ear normal.      Left Ear: Tympanic membrane, ear canal and external ear normal.      Ears:      Comments: Both TM injected , distorted L>R , purulent PND        Nose: Nose normal.      Mouth/Throat:      Mouth: Mucous membranes are moist.      Pharynx: Oropharynx is clear. Eyes:      General: No scleral icterus. Extraocular Movements: Extraocular movements intact. Conjunctiva/sclera: Conjunctivae normal.   Neck:      Vascular: No carotid bruit. Cardiovascular:      Rate and Rhythm: Normal rate and regular rhythm. Pulses: Normal pulses. Heart sounds: Normal heart sounds. Pulmonary:      Effort: Pulmonary effort is normal. No respiratory distress. Breath sounds: Normal breath sounds. No wheezing or rales. Abdominal:      General: Abdomen is flat. Bowel sounds are normal. There is no distension. Palpations: Abdomen is soft. There is no mass. Tenderness: There is no abdominal tenderness. Musculoskeletal:         General: Normal range of motion. Cervical back: Normal range of motion and neck supple. Skin:     General: Skin is warm and dry. Neurological:      General: No focal deficit present. Mental Status: She is alert and oriented to person, place, and time. Psychiatric:         Mood and Affect: Mood normal.         Behavior: Behavior normal.         Thought Content: Thought content normal.         Judgment: Judgment normal.       Hospital Outpatient Visit on 06/23/2022   Component Date Value Ref Range Status    Angio Convert Enzyme, CSF 06/23/2022 <1.5  0.0 - 2.5 U/L Final    Comment: (NOTE)  Results of this test are labeled for research purposes only by the  assay's . The performance characteristics of this assay  have not been established by the . The result should  not be used for treatment or for diagnostic purposes without  confirmation of the diagnosis by another medically established  diagnostic product or procedure.  The performance characteristics were  determined by [facility].  Performed At: [facility] & [address] [id number]  Karen Perez MD UW:[id number]      B burgdorferi 93kD IgG CSF, IB 06/23/2022 Absent   Final    B burgdorferi 66kD IgG CSF, IB 06/23/2022 Absent   Final    B burgdorferi 58kD IgG CSF, IB 06/23/2022 Absent   Final    B burgdorferi 45kD IgG CSF, IB 06/23/2022 Absent   Final    B burgdorferi 41kD IgG CSF, IB 06/23/2022 Present (A)    Final    B burgdorferi 39kD IgG CSF, IB 06/23/2022 Absent   Final    B burgdorferi 30kD IgG CSF, IB 06/23/2022 Absent   Final    B burgdorferi 28kD IgG CSF, IB 06/23/2022 Absent   Final    B burgdorferi 23kD IgG CSF, IB 06/23/2022 Absent   Final    B burgdorferi 18kD IgG CSF, IB 06/23/2022 Absent   Final    B Burgdorferi IgG Band Pattern, CS* 06/23/2022 Negative    Final    Comment: (NOTE)  Positive: 5 of the following bands: Borrelia-specific           bands: S06,03,05,78,33,57,20,76,49, and 93. Negative: No bands or banding patterns which do not meet           positive criteria. Note: For investigational use only. B burgdorferi 41kD IgM CSF, IB 06/23/2022 Absent   Final    B burgdorferi 39kD IgM CSF, IB 06/23/2022 Absent   Final    B burgdorferi 23kD IgM CSF, IB 06/23/2022 Absent   Final    B Burgdorferi IgM band pattern, CS* 06/23/2022 Negative    Final    Comment: (NOTE)  Note: An equivocal or positive EIA result followed by a negative  Line Blot result is considered NEGATIVE. An equivocal or positive  EIA result followed by a positive Line Blot is considered POSITIVE  by the CDC. Positive: 2 of the following bands: 23,39 or 41  Negative: No bands or banding patterns which do not meet positive  criteria.   Criteria for positivity are those recommended by CDC/ASTPHLD.  p23=Osp C, y90=pbptfiydm  Criteria for positivity are those recommended by CDC/ASTPHLD.  p23=Osp C, c76=qpkfkyhig  Performed At: [facility] & [address] [id number]  Karen Perez MD OF:[id number]      Specimen 06/23/2022 CEREBROSPINAL FLUID    Final    TUBE 4    Color, Fluid 06/23/2022 COLORLESS    Final    Appearance, Fluid 06/23/2022 CLEAR    Final    RBC, Fluid 06/23/2022 3  /cu mm Final    WBC, Fluid 06/23/2022 4  /cu mm Final    Comment: DIFF NOT INDICATED  WBC TOO FEW TO DIFFERENTIATE      IgG, CSF 06/23/2022 7.6 (A)  0.0 - 6.7 mg/dL Final    Albumin, CSF 06/23/2022 87 (A)  7 - 29 mg/dL Final    Albumin 06/23/2022 4.8  3.8 - 4.8 g/dL Final    Comment: (NOTE)  Performed At: [facility] & [address] [id number]  Vania Garcia MD HJ:[id number]      IgG, Serum 06/23/2022 791  586 - 1,602 mg/dL Final    Comment: (NOTE)  Performed At: [facility] & [address] [id number]  Vania Garcia MD BJ:[id number]      IgG/Alb Ratio, CSF 06/23/2022 0.09  0.00 - 0.25   Final    IgG Index, CSF 06/23/2022 0.5  0.0 - 0.7   Final    IgG Synthesis Rate, CSF 06/23/2022 3.9 (A)  NEG 9.9 TO +3.3 mg/day Final    Oligoclonal Bands Serum + CSF 06/23/2022 Comment    Final    Comment: (NOTE)  Zero (0) oligoclonal bands were observed in the CSF. Interpretation:  Criteria for Positivity: Four (4) or more oligoclonal bands  observed  only in the CSF have been shown to be most consistent with  MS  using our method. [Marcia AS, Tiburcio EL, Siobhan BG, and  Constantin JA: Cerebrospinal Fluid Oligoclonal Bands in the  Diagnosis  of Multiple Sclerosis. Am J Clin Pathol 120(5):672-675,  2003]. Oligoclonal bands that are present only in the CSF have been  associated with a variety of inflammatory brain diseases such as  multiple sclerosis (MS), subacute encephalitis, neurosyphilis,  etc.  Increased IgG in the CSF is not specific for MS, but is an  indication  of chronic neural inflammation. Clinical correlation  indicated.   Approximately 2-3% of clinically confirmed MS patients show little  or no evidence of oligoclonal bands in the CSF; however  oligoclonal  bands may develop as the disease progresses. Oligoclonal Banding testing performed using Isoelectric Focusing  (                           IEF) and immunoblotting methodology. Performed At: [address] [id number]  Mayela Hope MD PC:[id number]      CSF/Serum Albumin Index 06/23/2022 18 (A)  0 - 8   Final    Comment: (NOTE)          Relationship to blood-brain barrier:           Consistent with an intact barrier        <9           Slight impairment                   9 -  14           Moderate impairment                14 -  30           Severe impairment                  30 - 100           Complete breakdown                     >100  Performed At: [facility] [address], [place] [id number]  Mayela Hope MD JX:[id number]      Tube Number 06/23/2022 CEREBROSPINAL FLUID    Final    TUBE 1    Glucose, CSF 06/23/2022 62  40 - 70 MG/DL Final    Tube Number 06/23/2022 CEREBROSPINAL FLUID    Final    TUBE 1    Protein, CSF 06/23/2022 129 (A)  15 - 45 MG/DL Final    VDRL, CSF 06/23/2022 Non Reactive  Non Baltic:<1:1 Final    Comment: (NOTE)  Performed At: [address] [id number]  Mayela Hope MD WZ:[id number]      Special Requests 06/23/2022 NO SPECIAL REQUESTS    Final    Gram stain 06/23/2022 NO WBCS SEEN    Final    Gram stain 06/23/2022 NO DEFINITE ORGANISM SEEN    Final    Culture 06/23/2022 NO GROWTH 5 DAYS    Final       ASSESSMENT and PLAN    Allergic otitis media of both ears, unspecified chronicity  -     cefTRIAXone (ROCEPHIN) injection 1,000 mg; 1,000 mg, IntraMUSCular, ONCE, 1 dose, On Tue 12/20/22 at 0845Antimicrobial Indications: OtherOther Abx Indication: B O MReconstitute with 3.6 mL 1% lidocaine or sterile water. Acute bacterial sinusitis      Current treatment plan is effective. no changes in therapy    No follow-ups on file.      Consuelo Campos MD

## 2023-01-18 DIAGNOSIS — G43.009 MIGRAINE WITHOUT AURA AND WITHOUT STATUS MIGRAINOSUS, NOT INTRACTABLE: ICD-10-CM

## 2023-01-18 RX ORDER — RIZATRIPTAN BENZOATE 10 MG/1
10 TABLET ORAL
Qty: 10 TABLET | Refills: 5 | Status: SHIPPED | OUTPATIENT
Start: 2023-01-18 | End: 2023-01-18

## 2023-01-18 RX ORDER — RIZATRIPTAN BENZOATE 10 MG/1
10 TABLET ORAL
Qty: 10 TABLET | Refills: 3 | OUTPATIENT
Start: 2023-01-18 | End: 2023-01-18

## 2023-01-18 NOTE — TELEPHONE ENCOUNTER
Patient requesting refill Rizatriptan 10 mg.    Last OV: 11/07/2022  Future OV: 03/07/2023  Medication pended for approval, pharmacy verified   2

## 2023-03-07 ENCOUNTER — TELEMEDICINE (OUTPATIENT)
Dept: NEUROLOGY | Age: 38
End: 2023-03-07

## 2023-03-07 DIAGNOSIS — Z91.199 NO-SHOW FOR APPOINTMENT: Primary | ICD-10-CM

## 2023-03-07 NOTE — PROGRESS NOTES
No respond to doxies and phone call, left msg. Pt may have been seen by MS specialist by now, referred out to [place] [id number] [place].

## 2023-05-26 ENCOUNTER — TELEMEDICINE (OUTPATIENT)
Dept: FAMILY MEDICINE CLINIC | Facility: CLINIC | Age: 38
End: 2023-05-26
Payer: OTHER GOVERNMENT

## 2023-05-26 DIAGNOSIS — G43.009 MIGRAINE WITHOUT AURA AND WITHOUT STATUS MIGRAINOSUS, NOT INTRACTABLE: ICD-10-CM

## 2023-05-26 PROCEDURE — 99213 OFFICE O/P EST LOW 20 MIN: CPT | Performed by: NURSE PRACTITIONER

## 2023-05-26 RX ORDER — ATOGEPANT 60 MG/1
60 TABLET ORAL DAILY
Qty: 90 TABLET | Refills: 1 | Status: SHIPPED | OUTPATIENT
Start: 2023-05-26 | End: 2024-05-25

## 2023-05-26 RX ORDER — RIZATRIPTAN BENZOATE 10 MG/1
10 TABLET ORAL
Qty: 10 TABLET | Refills: 5 | Status: SHIPPED | OUTPATIENT
Start: 2023-05-26 | End: 2023-05-26

## 2023-05-26 SDOH — ECONOMIC STABILITY: FOOD INSECURITY: WITHIN THE PAST 12 MONTHS, THE FOOD YOU BOUGHT JUST DIDN'T LAST AND YOU DIDN'T HAVE MONEY TO GET MORE.: NEVER TRUE

## 2023-05-26 SDOH — ECONOMIC STABILITY: FOOD INSECURITY: WITHIN THE PAST 12 MONTHS, YOU WORRIED THAT YOUR FOOD WOULD RUN OUT BEFORE YOU GOT MONEY TO BUY MORE.: NEVER TRUE

## 2023-05-26 SDOH — ECONOMIC STABILITY: TRANSPORTATION INSECURITY
IN THE PAST 12 MONTHS, HAS LACK OF TRANSPORTATION KEPT YOU FROM MEETINGS, WORK, OR FROM GETTING THINGS NEEDED FOR DAILY LIVING?: NO

## 2023-05-26 SDOH — ECONOMIC STABILITY: HOUSING INSECURITY
IN THE LAST 12 MONTHS, WAS THERE A TIME WHEN YOU DID NOT HAVE A STEADY PLACE TO SLEEP OR SLEPT IN A SHELTER (INCLUDING NOW)?: NO

## 2023-05-26 SDOH — ECONOMIC STABILITY: INCOME INSECURITY: HOW HARD IS IT FOR YOU TO PAY FOR THE VERY BASICS LIKE FOOD, HOUSING, MEDICAL CARE, AND HEATING?: NOT HARD AT ALL

## 2023-05-26 ASSESSMENT — ENCOUNTER SYMPTOMS
SORE THROAT: 0
NAUSEA: 0
VOMITING: 0
COUGH: 0
EYE PAIN: 0
EYE REDNESS: 0
BACK PAIN: 0
SHORTNESS OF BREATH: 0
CONSTIPATION: 0
SINUS PAIN: 0
DIARRHEA: 0
BLOOD IN STOOL: 0

## 2023-05-26 NOTE — PROGRESS NOTES
Deshaun Small (:  1985) is a Established patient, presenting virtually for evaluation of the following:    Assessment & Plan   Below is the assessment and plan developed based on review of pertinent history, physical exam, labs, studies, and medications. 1. Migraine without aura and without status migrainosus, not intractable  -     Atogepant (QULIPTA) 60 MG TABS; Take 60 mg by mouth daily, Disp-90 tablet, R-1Normal  -     rizatriptan (MAXALT) 10 MG tablet; Take 1 tablet by mouth once as needed for Migraine May repeat in 2 hours if needed, max 2/24h., Disp-10 tablet, R-5Normal    No follow-ups on file. Subjective   Medication Refill  Associated symptoms include headaches. Pertinent negatives include no arthralgias, chest pain, chills, congestion, coughing, fever, myalgias, nausea, rash, sore throat or vomiting. Migraines improved significantly on Qulipta. Only occur once every couple months now. Trigger unknown. Straight across forehead when they do occur. Followed by neurology but states she is having a hard time getting them to refill her prescriptions. Takes Motrin PRN for slight headaches. Review of Systems   Constitutional:  Negative for chills and fever. HENT:  Negative for congestion, ear pain, sinus pain and sore throat. Eyes:  Negative for pain and redness. Respiratory:  Negative for cough and shortness of breath. Cardiovascular:  Negative for chest pain and palpitations. Gastrointestinal:  Negative for blood in stool, constipation, diarrhea, nausea and vomiting. Endocrine: Negative for polydipsia. Genitourinary:  Negative for dysuria, frequency and urgency. Musculoskeletal:  Negative for arthralgias, back pain and myalgias. Skin:  Negative for rash. Allergic/Immunologic: Negative for environmental allergies. Neurological:  Positive for headaches. Negative for dizziness. Psychiatric/Behavioral:  Negative for hallucinations and suicidal ideas.

## 2023-11-01 DIAGNOSIS — G43.009 MIGRAINE WITHOUT AURA AND WITHOUT STATUS MIGRAINOSUS, NOT INTRACTABLE: ICD-10-CM

## 2023-11-02 RX ORDER — ATOGEPANT 60 MG/1
1 TABLET ORAL DAILY
Qty: 90 TABLET | Refills: 1 | OUTPATIENT
Start: 2023-11-02

## 2023-11-14 DIAGNOSIS — G43.009 MIGRAINE WITHOUT AURA AND WITHOUT STATUS MIGRAINOSUS, NOT INTRACTABLE: ICD-10-CM

## 2023-11-14 NOTE — TELEPHONE ENCOUNTER
From: Ellis Foote  To:  Office of [employer]  Sent: 11/11/2023 7:22 AM EST  Subject: Medication Renewal Request    Refills have been requested for the following medications:     Atogepant (Smith Huger) 60 MG TABS [employer], APRN - CNP]    Preferred pharmacy: [facility] #[id number]- [address] - F [phone]

## 2023-11-17 RX ORDER — ATOGEPANT 60 MG/1
60 TABLET ORAL DAILY
Qty: 90 TABLET | Refills: 1 | Status: SHIPPED | OUTPATIENT
Start: 2023-11-17 | End: 2024-11-16

## 2024-02-16 ENCOUNTER — OFFICE VISIT (OUTPATIENT)
Dept: FAMILY MEDICINE CLINIC | Facility: CLINIC | Age: 39
End: 2024-02-16
Payer: OTHER GOVERNMENT

## 2024-02-16 VITALS
HEART RATE: 91 BPM | TEMPERATURE: 98.6 F | OXYGEN SATURATION: 99 % | SYSTOLIC BLOOD PRESSURE: 120 MMHG | BODY MASS INDEX: 24.94 KG/M2 | WEIGHT: 155.2 LBS | DIASTOLIC BLOOD PRESSURE: 70 MMHG | HEIGHT: 66 IN

## 2024-02-16 DIAGNOSIS — G35 MULTIPLE SCLEROSIS (HCC): ICD-10-CM

## 2024-02-16 DIAGNOSIS — Z13.220 SCREENING FOR LIPID DISORDERS: ICD-10-CM

## 2024-02-16 DIAGNOSIS — Z12.31 ENCOUNTER FOR SCREENING MAMMOGRAM FOR MALIGNANT NEOPLASM OF BREAST: ICD-10-CM

## 2024-02-16 DIAGNOSIS — G43.009 MIGRAINE WITHOUT AURA AND WITHOUT STATUS MIGRAINOSUS, NOT INTRACTABLE: Primary | ICD-10-CM

## 2024-02-16 PROCEDURE — 99213 OFFICE O/P EST LOW 20 MIN: CPT | Performed by: NURSE PRACTITIONER

## 2024-02-16 RX ORDER — ATOGEPANT 60 MG/1
60 TABLET ORAL DAILY
Qty: 90 TABLET | Refills: 1 | Status: SHIPPED | OUTPATIENT
Start: 2024-02-16 | End: 2025-02-15

## 2024-02-16 RX ORDER — RIZATRIPTAN BENZOATE 10 MG/1
10 TABLET ORAL
Qty: 10 TABLET | Refills: 5 | Status: SHIPPED | OUTPATIENT
Start: 2024-02-16 | End: 2024-02-16

## 2024-02-16 NOTE — PROGRESS NOTES
Heidi Mcclelland (:  1985) is a 38 y.o. female,Established patient, here for evaluation of the following chief complaint(s):  Medication Refill and Migraine         ASSESSMENT/PLAN:  1. Migraine without aura and without status migrainosus, not intractable  -     rizatriptan (MAXALT) 10 MG tablet; Take 1 tablet by mouth once as needed for Migraine May repeat in 2 hours if needed, max 2/24h., Disp-10 tablet, R-5Normal  -     Atogepant (QULIPTA) 60 MG TABS; Take 60 mg by mouth daily, Disp-90 tablet, R-1Normal  -     CBC with Auto Differential; Future  -     Comprehensive Metabolic Panel; Future  -     TSH; Future  -     Urinalysis; Future  2. Screening for lipid disorders  -     Comprehensive Metabolic Panel; Future  -     Lipid Panel; Future  3. Multiple sclerosis (HCC)  -     [facility] - Anali Larose MD, Neurology, [facility]  4. Encounter for screening mammogram for malignant neoplasm of breast  -     DEMETRI DIGITAL SCREEN W OR WO CAD BILATERAL; Future    Due for labs and mammo in April.  She is agreeable with the plan.  Medication refills provided.  Discussed medications, side effects and risks versus benefits in detail.  Recommended f/up with neuro and she is agreeable with the plan.    Return in about 6 months (around 2024), or if symptoms worsen or fail to improve.         Subjective   SUBJECTIVE/OBJECTIVE:  Medication Refill  Pertinent negatives include no arthralgias, chest pain, chills, congestion, coughing, fever, headaches, myalgias, nausea, rash, sore throat or vomiting.   Migraine   Pertinent negatives include no back pain, coughing, dizziness, ear pain, eye pain, eye redness, fever, nausea, sore throat or vomiting.   Chronic migraines.  Patient reports stable on Qulipta and vitamin D.  Three breakthrough episodes in the last 8-12 months per patient.  Present across the forehead.  Aggravated by looking at screens at work for long periods of time.  Alleviated by Maxalt and rest.  Eye exam is

## 2024-03-26 ENCOUNTER — OFFICE VISIT (OUTPATIENT)
Dept: FAMILY MEDICINE CLINIC | Facility: CLINIC | Age: 39
End: 2024-03-26
Payer: OTHER GOVERNMENT

## 2024-03-26 VITALS
HEIGHT: 66 IN | HEART RATE: 94 BPM | OXYGEN SATURATION: 100 % | TEMPERATURE: 97.6 F | WEIGHT: 160 LBS | SYSTOLIC BLOOD PRESSURE: 136 MMHG | DIASTOLIC BLOOD PRESSURE: 77 MMHG | BODY MASS INDEX: 25.71 KG/M2 | RESPIRATION RATE: 16 BRPM

## 2024-03-26 DIAGNOSIS — Z13.220 SCREENING FOR LIPID DISORDERS: ICD-10-CM

## 2024-03-26 DIAGNOSIS — G43.009 MIGRAINE WITHOUT AURA AND WITHOUT STATUS MIGRAINOSUS, NOT INTRACTABLE: ICD-10-CM

## 2024-03-26 LAB
ALBUMIN SERPL-MCNC: 4.7 G/DL (ref 3.5–5)
ALBUMIN/GLOB SERPL: 1.7 (ref 0.4–1.6)
ALP SERPL-CCNC: 74 U/L (ref 50–136)
ALT SERPL-CCNC: 22 U/L (ref 12–65)
ANION GAP SERPL CALC-SCNC: 5 MMOL/L (ref 2–11)
APPEARANCE UR: CLEAR
AST SERPL-CCNC: 17 U/L (ref 15–37)
BACTERIA URNS QL MICRO: NEGATIVE /HPF
BASOPHILS # BLD: 0.1 K/UL (ref 0–0.2)
BASOPHILS NFR BLD: 1 % (ref 0–2)
BILIRUB SERPL-MCNC: 0.9 MG/DL (ref 0.2–1.1)
BILIRUB UR QL: NEGATIVE
BUN SERPL-MCNC: 7 MG/DL (ref 6–23)
CALCIUM SERPL-MCNC: 9.7 MG/DL (ref 8.3–10.4)
CASTS URNS QL MICRO: ABNORMAL /LPF (ref 0–2)
CHLORIDE SERPL-SCNC: 107 MMOL/L (ref 103–113)
CHOLEST SERPL-MCNC: 173 MG/DL
CO2 SERPL-SCNC: 27 MMOL/L (ref 21–32)
COLOR UR: ABNORMAL
CREAT SERPL-MCNC: 0.8 MG/DL (ref 0.6–1)
DIFFERENTIAL METHOD BLD: NORMAL
EOSINOPHIL # BLD: 0.2 K/UL (ref 0–0.8)
EOSINOPHIL NFR BLD: 2 % (ref 0.5–7.8)
EPI CELLS #/AREA URNS HPF: ABNORMAL /HPF (ref 0–5)
ERYTHROCYTE [DISTWIDTH] IN BLOOD BY AUTOMATED COUNT: 11.9 % (ref 11.9–14.6)
GLOBULIN SER CALC-MCNC: 2.7 G/DL (ref 2.8–4.5)
GLUCOSE SERPL-MCNC: 95 MG/DL (ref 65–100)
GLUCOSE UR STRIP.AUTO-MCNC: NEGATIVE MG/DL
HCT VFR BLD AUTO: 46.3 % (ref 35.8–46.3)
HDLC SERPL-MCNC: 55 MG/DL (ref 40–60)
HDLC SERPL: 3.1
HGB BLD-MCNC: 15.4 G/DL (ref 11.7–15.4)
HGB UR QL STRIP: ABNORMAL
IMM GRANULOCYTES # BLD AUTO: 0 K/UL (ref 0–0.5)
IMM GRANULOCYTES NFR BLD AUTO: 0 % (ref 0–5)
KETONES UR QL STRIP.AUTO: NEGATIVE MG/DL
LDLC SERPL CALC-MCNC: 97 MG/DL
LEUKOCYTE ESTERASE UR QL STRIP.AUTO: NEGATIVE
LYMPHOCYTES # BLD: 2.5 K/UL (ref 0.5–4.6)
LYMPHOCYTES NFR BLD: 33 % (ref 13–44)
MCH RBC QN AUTO: 29.6 PG (ref 26.1–32.9)
MCHC RBC AUTO-ENTMCNC: 33.3 G/DL (ref 31.4–35)
MCV RBC AUTO: 89 FL (ref 82–102)
MONOCYTES # BLD: 0.6 K/UL (ref 0.1–1.3)
MONOCYTES NFR BLD: 8 % (ref 4–12)
NEUTS SEG # BLD: 4.3 K/UL (ref 1.7–8.2)
NEUTS SEG NFR BLD: 56 % (ref 43–78)
NITRITE UR QL STRIP.AUTO: NEGATIVE
NRBC # BLD: 0 K/UL (ref 0–0.2)
PH UR STRIP: 7 (ref 5–9)
PLATELET # BLD AUTO: 292 K/UL (ref 150–450)
PMV BLD AUTO: 9.7 FL (ref 9.4–12.3)
POTASSIUM SERPL-SCNC: 4 MMOL/L (ref 3.5–5.1)
PROT SERPL-MCNC: 7.4 G/DL (ref 6.3–8.2)
PROT UR STRIP-MCNC: NEGATIVE MG/DL
RBC # BLD AUTO: 5.2 M/UL (ref 4.05–5.2)
RBC #/AREA URNS HPF: ABNORMAL /HPF (ref 0–5)
SODIUM SERPL-SCNC: 139 MMOL/L (ref 136–146)
SP GR UR REFRACTOMETRY: <1.005 (ref 1–1.02)
TRIGL SERPL-MCNC: 105 MG/DL (ref 35–150)
TSH, 3RD GENERATION: 3.13 UIU/ML (ref 0.36–3.74)
UROBILINOGEN UR QL STRIP.AUTO: 0.2 EU/DL (ref 0.2–1)
VLDLC SERPL CALC-MCNC: 21 MG/DL (ref 6–23)
WBC # BLD AUTO: 7.7 K/UL (ref 4.3–11.1)
WBC URNS QL MICRO: ABNORMAL /HPF (ref 0–4)

## 2024-03-26 PROCEDURE — 99213 OFFICE O/P EST LOW 20 MIN: CPT | Performed by: NURSE PRACTITIONER

## 2024-03-26 RX ORDER — RIZATRIPTAN BENZOATE 10 MG/1
10 TABLET ORAL DAILY PRN
Qty: 10 TABLET | Refills: 5 | Status: SHIPPED | OUTPATIENT
Start: 2024-03-26 | End: 2024-03-27 | Stop reason: SDUPTHER

## 2024-03-26 SDOH — ECONOMIC STABILITY: FOOD INSECURITY: WITHIN THE PAST 12 MONTHS, YOU WORRIED THAT YOUR FOOD WOULD RUN OUT BEFORE YOU GOT MONEY TO BUY MORE.: NEVER TRUE

## 2024-03-26 SDOH — ECONOMIC STABILITY: FOOD INSECURITY: WITHIN THE PAST 12 MONTHS, THE FOOD YOU BOUGHT JUST DIDN'T LAST AND YOU DIDN'T HAVE MONEY TO GET MORE.: NEVER TRUE

## 2024-03-26 SDOH — ECONOMIC STABILITY: INCOME INSECURITY: HOW HARD IS IT FOR YOU TO PAY FOR THE VERY BASICS LIKE FOOD, HOUSING, MEDICAL CARE, AND HEATING?: NOT HARD AT ALL

## 2024-03-26 ASSESSMENT — PATIENT HEALTH QUESTIONNAIRE - PHQ9
SUM OF ALL RESPONSES TO PHQ9 QUESTIONS 1 & 2: 0
SUM OF ALL RESPONSES TO PHQ QUESTIONS 1-9: 0
1. LITTLE INTEREST OR PLEASURE IN DOING THINGS: NOT AT ALL
SUM OF ALL RESPONSES TO PHQ QUESTIONS 1-9: 0
2. FEELING DOWN, DEPRESSED OR HOPELESS: NOT AT ALL

## 2024-03-26 NOTE — PROGRESS NOTES
ORGANISM SEEN    Final    Culture 2022 NO GROWTH 5 DAYS    Final       Asessment and Plan  1. Migraine without aura and without status migrainosus, not intractable  -     rizatriptan (MAXALT) 10 MG tablet; Take 1 tablet by mouth daily as needed for Migraine May repeat in 2 hours if needed, max 2/24h., Disp-10 tablet, R-5Normal  -     Urinalysis  -     TSH  -     Comprehensive Metabolic Panel  -     CBC with Auto Differential  2. Screening for lipid disorders  -     Lipid Panel  -     Comprehensive Metabolic Panel    Work note given today stating she may return to work immediately with no restrictions. Maxalt refilled today as it looks like Rx already , she takes this less than 10 times per month. Continue current medication regimen. Keep appt with Neurology in May.     Instructed to have labs done in our office today recently ordered by SHORTY Alejandro.     Return if symptoms worsen or fail to improve.    CHERIE Ca - NP  On this date I have spent 24 minutes reviewing previous notes, test results and face to face with the patient discussing the diagnosis and importance of compliance with the treatment plan as well as documenting on the day of the visit.

## 2024-03-27 ENCOUNTER — OFFICE VISIT (OUTPATIENT)
Dept: NEUROLOGY | Age: 39
End: 2024-03-27
Payer: OTHER GOVERNMENT

## 2024-03-27 VITALS
SYSTOLIC BLOOD PRESSURE: 139 MMHG | HEART RATE: 68 BPM | WEIGHT: 161 LBS | DIASTOLIC BLOOD PRESSURE: 88 MMHG | BODY MASS INDEX: 25.88 KG/M2 | HEIGHT: 66 IN

## 2024-03-27 DIAGNOSIS — G35 MULTIPLE SCLEROSIS, RELAPSING-REMITTING (HCC): Primary | ICD-10-CM

## 2024-03-27 DIAGNOSIS — G43.009 MIGRAINE WITHOUT AURA AND WITHOUT STATUS MIGRAINOSUS, NOT INTRACTABLE: ICD-10-CM

## 2024-03-27 PROBLEM — Z91.199 NO-SHOW FOR APPOINTMENT: Status: RESOLVED | Noted: 2023-03-07 | Resolved: 2024-03-27

## 2024-03-27 PROCEDURE — 99215 OFFICE O/P EST HI 40 MIN: CPT | Performed by: NURSE PRACTITIONER

## 2024-03-27 RX ORDER — FREMANEZUMAB-VFRM 225 MG/1.5ML
225 INJECTION SUBCUTANEOUS
Qty: 4.5 ML | Refills: 0 | Status: SHIPPED | OUTPATIENT
Start: 2024-03-27 | End: 2024-04-26

## 2024-03-27 RX ORDER — RIZATRIPTAN BENZOATE 10 MG/1
10 TABLET ORAL
Qty: 9 TABLET | Refills: 2 | Status: SHIPPED | OUTPATIENT
Start: 2024-03-27 | End: 2024-03-27

## 2024-03-27 ASSESSMENT — ENCOUNTER SYMPTOMS
NAUSEA: 1
VOMITING: 1
PHOTOPHOBIA: 1

## 2024-03-27 NOTE — ASSESSMENT & PLAN NOTE
Lesions located to periventricular brain, juxtacortical white matter and infratentorial brain along with C-spine lesions.  Patient is NMO negative.  IgG and IgG synthesis rate elevated in CSF.  Negative for oligoclonal bands.      She has experienced a recent relapse with left anterior thigh numbness lasting for 1 week and spontaneously resolving.  Will treat with 2 days of 1000 mg of IV Solu-Medrol and repeat MRI brain and cervical spine 30 days after the completion of her steroids.  I will see her back in 2 months to assess disease modifying therapy      Provided information regarding the [employer] MS [employer]

## 2024-03-27 NOTE — ASSESSMENT & PLAN NOTE
Discontinue Qulipta.  First dose of Ajovy administered in office.    Encouraged early intervention at onset of her migraine as I do feel that her lack of intervening is prolonging her symptoms.  She will administer rizatriptan at onset this may be repeated in 2 hours if she remains symptomatic.  For maximum of 2 tablets per 24 hours.  No more than 2 times per week.  Keep migraine diary to assess reduction in frequency and severity with the addition of Ajovy.  Will reassess at her next office visit.

## 2024-03-27 NOTE — PROGRESS NOTES
uncovertebral joint hypertrophic degenerative changes. Mild right  neural foraminal stenosis.     C6-7: Shallow broad-based posterior disc osteophyte complex. No central canal  stenosis. No significant neural foramina stenosis.     C7-T1: No disc herniation, central canal stenosis or neural foraminal stenosis.        THORACIC SPINE:  Alignment of thoracic vertebrae is maintained without abnormal listhesis. No  vertebral body height abnormality is seen. No acute appearing STIR signal  changes are seen of the thoracic vertebrae or adjacent soft tissues.     No disc herniation, central canal stenosis, or neural foraminal stenosis is seen  at any level.     No abnormal cord signal and caliber changes are seen of the thoracic cord to  suggest an additional thoracic cord lesion. No abnormal enhancement is seen.     IMPRESSION:  1. Multiple cervical cord lesions demonstrate mild enhancement concerning for  active demyelinating lesions possibly related to multiple sclerosis.                Previous Testing:   Lab Results   Component Value Date    WBC 7.7 03/26/2024    HGB 15.4 03/26/2024    HCT 46.3 03/26/2024    MCV 89.0 03/26/2024     03/26/2024      Lab Results   Component Value Date/Time     03/26/2024 11:11 AM    K 4.0 03/26/2024 11:11 AM     03/26/2024 11:11 AM    CO2 27 03/26/2024 11:11 AM    BUN 7 03/26/2024 11:11 AM    CREATININE 0.80 03/26/2024 11:11 AM    GLUCOSE 95 03/26/2024 11:11 AM    CALCIUM 9.7 03/26/2024 11:11 AM    LABGLOM >90 03/26/2024 11:11 AM       SH  Order: [id number]  Status: Final result       Visible to patient: Yes (seen)       Next appt: None       Dx: Migraine without aura and without sta...    0 Result Notes      Component  Ref Range & Units 3/26/24 1111   TSH, 3RD GENERATION  0.358 - 3.740 uIU/mL 3.130            Multiple sclerosis profile  Order: [id number]  Status: Final result       Visible to patient: Yes (seen)       Next appt: Today at 12:30 PM in Neurology ([facility]

## 2024-03-28 ENCOUNTER — NURSE ONLY (OUTPATIENT)
Age: 39
End: 2024-03-28
Payer: OTHER GOVERNMENT

## 2024-03-28 VITALS
HEART RATE: 100 BPM | RESPIRATION RATE: 18 BRPM | DIASTOLIC BLOOD PRESSURE: 87 MMHG | TEMPERATURE: 97.9 F | SYSTOLIC BLOOD PRESSURE: 135 MMHG | OXYGEN SATURATION: 100 %

## 2024-03-28 DIAGNOSIS — G35 MULTIPLE SCLEROSIS (HCC): Primary | ICD-10-CM

## 2024-03-28 PROCEDURE — 96365 THER/PROPH/DIAG IV INF INIT: CPT | Performed by: NURSE PRACTITIONER

## 2024-03-28 RX ORDER — SODIUM CHLORIDE 9 MG/ML
5-250 INJECTION, SOLUTION INTRAVENOUS PRN
Status: CANCELLED | OUTPATIENT
Start: 2024-03-28

## 2024-03-28 RX ORDER — ACETAMINOPHEN 325 MG/1
650 TABLET ORAL
Status: DISCONTINUED | OUTPATIENT
Start: 2024-03-28 | End: 2024-03-28 | Stop reason: HOSPADM

## 2024-03-28 RX ORDER — SODIUM CHLORIDE 0.9 % (FLUSH) 0.9 %
5-40 SYRINGE (ML) INJECTION PRN
Status: CANCELLED | OUTPATIENT
Start: 2024-03-28

## 2024-03-28 RX ORDER — EPINEPHRINE 1 MG/ML
0.3 INJECTION, SOLUTION, CONCENTRATE INTRAVENOUS PRN
Status: CANCELLED | OUTPATIENT
Start: 2024-03-28

## 2024-03-28 RX ORDER — SODIUM CHLORIDE 9 MG/ML
5-250 INJECTION, SOLUTION INTRAVENOUS PRN
Status: DISCONTINUED | OUTPATIENT
Start: 2024-03-28 | End: 2024-03-28 | Stop reason: HOSPADM

## 2024-03-28 RX ORDER — EPINEPHRINE 1 MG/ML
0.3 INJECTION, SOLUTION, CONCENTRATE INTRAVENOUS PRN
Status: DISCONTINUED | OUTPATIENT
Start: 2024-03-28 | End: 2024-03-28 | Stop reason: HOSPADM

## 2024-03-28 RX ORDER — DIPHENHYDRAMINE HYDROCHLORIDE 50 MG/ML
50 INJECTION INTRAMUSCULAR; INTRAVENOUS
Status: CANCELLED | OUTPATIENT
Start: 2024-03-28

## 2024-03-28 RX ORDER — ACETAMINOPHEN 325 MG/1
650 TABLET ORAL
Status: CANCELLED | OUTPATIENT
Start: 2024-03-28

## 2024-03-28 RX ORDER — SODIUM CHLORIDE 0.9 % (FLUSH) 0.9 %
5-40 SYRINGE (ML) INJECTION PRN
Status: CANCELLED | OUTPATIENT
Start: 2024-03-29

## 2024-03-28 RX ORDER — SODIUM CHLORIDE 9 MG/ML
5-250 INJECTION, SOLUTION INTRAVENOUS PRN
Status: CANCELLED | OUTPATIENT
Start: 2024-03-29

## 2024-03-28 RX ORDER — SODIUM CHLORIDE 9 MG/ML
INJECTION, SOLUTION INTRAVENOUS CONTINUOUS
Status: DISCONTINUED | OUTPATIENT
Start: 2024-03-28 | End: 2024-03-28 | Stop reason: HOSPADM

## 2024-03-28 RX ORDER — DIPHENHYDRAMINE HYDROCHLORIDE 50 MG/ML
50 INJECTION INTRAMUSCULAR; INTRAVENOUS
Status: CANCELLED | OUTPATIENT
Start: 2024-03-29

## 2024-03-28 RX ORDER — HEPARIN 100 UNIT/ML
500 SYRINGE INTRAVENOUS PRN
Status: CANCELLED | OUTPATIENT
Start: 2024-03-29

## 2024-03-28 RX ORDER — SODIUM CHLORIDE 9 MG/ML
INJECTION, SOLUTION INTRAVENOUS CONTINUOUS
Status: CANCELLED | OUTPATIENT
Start: 2024-03-29

## 2024-03-28 RX ORDER — HEPARIN 100 UNIT/ML
500 SYRINGE INTRAVENOUS PRN
Status: CANCELLED | OUTPATIENT
Start: 2024-03-28

## 2024-03-28 RX ORDER — HEPARIN 100 UNIT/ML
500 SYRINGE INTRAVENOUS PRN
Status: DISCONTINUED | OUTPATIENT
Start: 2024-03-28 | End: 2024-03-28 | Stop reason: HOSPADM

## 2024-03-28 RX ORDER — ACETAMINOPHEN 325 MG/1
650 TABLET ORAL
Status: CANCELLED | OUTPATIENT
Start: 2024-03-29

## 2024-03-28 RX ORDER — ALBUTEROL SULFATE 90 UG/1
4 AEROSOL, METERED RESPIRATORY (INHALATION) PRN
Status: CANCELLED | OUTPATIENT
Start: 2024-03-29

## 2024-03-28 RX ORDER — EPINEPHRINE 1 MG/ML
0.3 INJECTION, SOLUTION, CONCENTRATE INTRAVENOUS PRN
Status: CANCELLED | OUTPATIENT
Start: 2024-03-29

## 2024-03-28 RX ORDER — ALBUTEROL SULFATE 90 UG/1
4 AEROSOL, METERED RESPIRATORY (INHALATION) PRN
Status: CANCELLED | OUTPATIENT
Start: 2024-03-28

## 2024-03-28 RX ORDER — ONDANSETRON 2 MG/ML
8 INJECTION INTRAMUSCULAR; INTRAVENOUS
Status: CANCELLED | OUTPATIENT
Start: 2024-03-29

## 2024-03-28 RX ORDER — SODIUM CHLORIDE 0.9 % (FLUSH) 0.9 %
5-40 SYRINGE (ML) INJECTION PRN
Status: DISCONTINUED | OUTPATIENT
Start: 2024-03-28 | End: 2024-03-28 | Stop reason: HOSPADM

## 2024-03-28 RX ORDER — SODIUM CHLORIDE 9 MG/ML
INJECTION, SOLUTION INTRAVENOUS CONTINUOUS
Status: CANCELLED | OUTPATIENT
Start: 2024-03-28

## 2024-03-28 RX ORDER — DIPHENHYDRAMINE HYDROCHLORIDE 50 MG/ML
50 INJECTION INTRAMUSCULAR; INTRAVENOUS
Status: DISCONTINUED | OUTPATIENT
Start: 2024-03-28 | End: 2024-03-28 | Stop reason: HOSPADM

## 2024-03-28 RX ORDER — ONDANSETRON 2 MG/ML
8 INJECTION INTRAMUSCULAR; INTRAVENOUS
Status: CANCELLED | OUTPATIENT
Start: 2024-03-28

## 2024-03-28 RX ORDER — ONDANSETRON 2 MG/ML
8 INJECTION INTRAMUSCULAR; INTRAVENOUS
Status: DISCONTINUED | OUTPATIENT
Start: 2024-03-28 | End: 2024-03-28 | Stop reason: HOSPADM

## 2024-03-28 RX ORDER — ALBUTEROL SULFATE 90 UG/1
4 AEROSOL, METERED RESPIRATORY (INHALATION) PRN
Status: DISCONTINUED | OUTPATIENT
Start: 2024-03-28 | End: 2024-03-28 | Stop reason: HOSPADM

## 2024-03-28 NOTE — PROGRESS NOTES
[facility]  [address], [address]  [place] [address]  Office : [phone], Fax: [phone]     Patient arrived ambulatory to infusion suite today for a steriod infusion. Vital signs WNL. No contraindications present. 22g 1\" IV placed in left hand x 1 attempt. Patient tolerated well. Solu-Medrol 1000mg administered in 250cc NS and administered per orders at 500 cc/hr for 20 minutes     Patient tolerated the infusion well, no complications noted. No observation required/recommended.     Patient offered warm blanket and pillow for comfort. Patient up ad shana to BR; offered drink and snacks during visit.    Post infusion vital signs WNL. IV catheter flushed with 10cc NS. IV site wrapped and secured for tomorrows infusion. Site covered with gauze and pressure dressing.      Patient discharged ambulatory with steady gait out of infusion suite, feeling well.     Patient instructed to call the ordering provider with any post-infusion issues.     Next appointment scheduled at a date/time convenient for them prior to pt's departure today.

## 2024-03-29 ENCOUNTER — NURSE ONLY (OUTPATIENT)
Age: 39
End: 2024-03-29

## 2024-03-29 VITALS
RESPIRATION RATE: 18 BRPM | DIASTOLIC BLOOD PRESSURE: 76 MMHG | TEMPERATURE: 99.3 F | HEART RATE: 88 BPM | OXYGEN SATURATION: 98 % | SYSTOLIC BLOOD PRESSURE: 124 MMHG

## 2024-03-29 DIAGNOSIS — G35 MULTIPLE SCLEROSIS (HCC): Primary | ICD-10-CM

## 2024-03-29 RX ORDER — SODIUM CHLORIDE 9 MG/ML
5-250 INJECTION, SOLUTION INTRAVENOUS PRN
Status: SHIPPED | OUTPATIENT
Start: 2024-03-29 | End: 2024-03-30

## 2024-03-29 RX ORDER — DIPHENHYDRAMINE HYDROCHLORIDE 50 MG/ML
50 INJECTION INTRAMUSCULAR; INTRAVENOUS
Status: SHIPPED | OUTPATIENT
Start: 2024-03-29 | End: 2024-03-30

## 2024-03-29 RX ORDER — ONDANSETRON 2 MG/ML
8 INJECTION INTRAMUSCULAR; INTRAVENOUS
Status: SHIPPED | OUTPATIENT
Start: 2024-03-29 | End: 2024-03-30

## 2024-03-29 RX ORDER — ACETAMINOPHEN 325 MG/1
650 TABLET ORAL
Status: SHIPPED | OUTPATIENT
Start: 2024-03-29 | End: 2024-03-30

## 2024-03-29 RX ORDER — SODIUM CHLORIDE 0.9 % (FLUSH) 0.9 %
5-40 SYRINGE (ML) INJECTION PRN
Status: SHIPPED | OUTPATIENT
Start: 2024-03-29 | End: 2024-03-30

## 2024-03-29 RX ORDER — ALBUTEROL SULFATE 90 UG/1
4 AEROSOL, METERED RESPIRATORY (INHALATION) PRN
Status: DISCONTINUED | OUTPATIENT
Start: 2024-03-29 | End: 2024-04-02 | Stop reason: HOSPADM

## 2024-03-29 RX ORDER — HEPARIN 100 UNIT/ML
500 SYRINGE INTRAVENOUS PRN
Status: SHIPPED | OUTPATIENT
Start: 2024-03-29 | End: 2024-03-30

## 2024-03-29 RX ORDER — SODIUM CHLORIDE 9 MG/ML
INJECTION, SOLUTION INTRAVENOUS CONTINUOUS
Status: DISCONTINUED | OUTPATIENT
Start: 2024-03-29 | End: 2024-04-02 | Stop reason: HOSPADM

## 2024-03-29 RX ORDER — EPINEPHRINE 1 MG/ML
0.3 INJECTION, SOLUTION, CONCENTRATE INTRAVENOUS PRN
Status: DISCONTINUED | OUTPATIENT
Start: 2024-03-29 | End: 2024-04-02 | Stop reason: HOSPADM

## 2024-03-29 NOTE — PROGRESS NOTES
[facility] [place] [facility]  [address], [address]  [place] [address]  Office : [phone], Fax: [phone]     Patient arrived ambulatory to infusion suite today for an IV infusion. Vital signs WNL. No contraindications present. 22g 1\" IV placed in left hand 03/28/2024 x 1 attempt. Verbal order from Dr. Héctor Garcia to leave IV  in overnight for today's treatment. Patient tolerated well. Site patent, flushes without pain or swelling. Brisk blood return noted. Solu-Medrol administered per orders for 30 minutes     Patient tolerated the infusion well, no complications noted. No observation required/recommended.      Patient offered warm blanket and pillow for comfort. Patient up ad shana to BR; offered drink and snacks during visit.    IV catheter flushed with 10cc NS. Catheter removed without difficulty. Patient tolerated well. Site covered with gauze and pressure dressing.      Patient discharged ambulatory with steady gait out of infusion suite, feeling well.     Patient instructed to call the ordering provider with any post-infusion issues.     Next appointment scheduled at a date/time convenient for them prior to pt's departure today.

## 2024-04-24 DIAGNOSIS — F40.240 CLAUSTROPHOBIA: Primary | ICD-10-CM

## 2024-04-24 RX ORDER — LORAZEPAM 1 MG/1
TABLET ORAL
Qty: 2 TABLET | Refills: 0 | Status: SHIPPED | OUTPATIENT
Start: 2024-04-24 | End: 2024-05-02

## 2024-04-24 NOTE — PROGRESS NOTES
Patient with claustrophobia, will need oral pre-medication before MRI scheduled on 4/30. Scripts checked. Advised she must have a  if she takes this.

## 2024-04-30 ENCOUNTER — HOSPITAL ENCOUNTER (OUTPATIENT)
Dept: MRI IMAGING | Age: 39
Discharge: HOME | End: 2024-05-03
Payer: OTHER GOVERNMENT

## 2024-04-30 DIAGNOSIS — G35 MULTIPLE SCLEROSIS, RELAPSING-REMITTING (HCC): ICD-10-CM

## 2024-04-30 PROCEDURE — 6360000004 HC RX CONTRAST MEDICATION: Performed by: NURSE PRACTITIONER

## 2024-04-30 PROCEDURE — 70553 MRI BRAIN STEM W/O & W/DYE: CPT

## 2024-04-30 PROCEDURE — 72156 MRI NECK SPINE W/O & W/DYE: CPT

## 2024-04-30 PROCEDURE — A9579 GAD-BASE MR CONTRAST NOS,1ML: HCPCS | Performed by: NURSE PRACTITIONER

## 2024-04-30 RX ORDER — SODIUM CHLORIDE 0.9 % (FLUSH) 0.9 %
10 SYRINGE (ML) INJECTION AS NEEDED
Status: DISCONTINUED | OUTPATIENT
Start: 2024-04-30 | End: 2024-05-04 | Stop reason: HOSPADM

## 2024-04-30 RX ADMIN — GADOTERIDOL 15 ML: 279.3 INJECTION, SOLUTION INTRAVENOUS at 21:00

## 2024-05-10 ENCOUNTER — CLINICAL DOCUMENTATION (OUTPATIENT)
Dept: NEUROLOGY | Age: 39
End: 2024-05-10

## 2024-05-10 ENCOUNTER — OFFICE VISIT (OUTPATIENT)
Dept: NEUROLOGY | Age: 39
End: 2024-05-10
Payer: OTHER GOVERNMENT

## 2024-05-10 VITALS
DIASTOLIC BLOOD PRESSURE: 80 MMHG | WEIGHT: 165 LBS | HEART RATE: 86 BPM | BODY MASS INDEX: 26.63 KG/M2 | SYSTOLIC BLOOD PRESSURE: 128 MMHG | OXYGEN SATURATION: 98 %

## 2024-05-10 DIAGNOSIS — Z11.59 SCREENING FOR VIRAL DISEASE: ICD-10-CM

## 2024-05-10 DIAGNOSIS — G35 MULTIPLE SCLEROSIS (HCC): ICD-10-CM

## 2024-05-10 DIAGNOSIS — E55.9 VITAMIN D DEFICIENCY: ICD-10-CM

## 2024-05-10 DIAGNOSIS — G35 MULTIPLE SCLEROSIS (HCC): Primary | ICD-10-CM

## 2024-05-10 DIAGNOSIS — Z11.1 SCREENING FOR TUBERCULOSIS: ICD-10-CM

## 2024-05-10 DIAGNOSIS — F41.9 ANXIETY: ICD-10-CM

## 2024-05-10 DIAGNOSIS — G35 MULTIPLE SCLEROSIS, RELAPSING-REMITTING (HCC): ICD-10-CM

## 2024-05-10 DIAGNOSIS — G43.009 MIGRAINE WITHOUT AURA AND WITHOUT STATUS MIGRAINOSUS, NOT INTRACTABLE: ICD-10-CM

## 2024-05-10 DIAGNOSIS — N31.9 NEUROGENIC BLADDER: ICD-10-CM

## 2024-05-10 LAB
25(OH)D3 SERPL-MCNC: 68.8 NG/ML (ref 30–100)
ALBUMIN SERPL-MCNC: 4.8 G/DL (ref 3.5–5)
ALBUMIN/GLOB SERPL: 2.3 (ref 1–1.9)
ALP SERPL-CCNC: 69 U/L (ref 35–104)
ALT SERPL-CCNC: 17 U/L (ref 12–65)
ANION GAP SERPL CALC-SCNC: 11 MMOL/L (ref 9–18)
AST SERPL-CCNC: 25 U/L (ref 15–37)
BASOPHILS # BLD: 0.1 K/UL (ref 0–0.2)
BASOPHILS NFR BLD: 1 % (ref 0–2)
BILIRUB SERPL-MCNC: 1 MG/DL (ref 0–1.2)
BUN SERPL-MCNC: 6 MG/DL (ref 6–23)
CALCIUM SERPL-MCNC: 9.8 MG/DL (ref 8.8–10.2)
CHLORIDE SERPL-SCNC: 103 MMOL/L (ref 98–107)
CO2 SERPL-SCNC: 27 MMOL/L (ref 20–28)
CREAT SERPL-MCNC: 0.72 MG/DL (ref 0.6–1.1)
DIFFERENTIAL METHOD BLD: ABNORMAL
EOSINOPHIL # BLD: 0.1 K/UL (ref 0–0.8)
EOSINOPHIL NFR BLD: 2 % (ref 0.5–7.8)
ERYTHROCYTE [DISTWIDTH] IN BLOOD BY AUTOMATED COUNT: 11.6 % (ref 11.9–14.6)
GLOBULIN SER CALC-MCNC: 2.1 G/DL (ref 2.3–3.5)
GLUCOSE SERPL-MCNC: 92 MG/DL (ref 70–99)
HBV SURFACE AB SERPL IA-ACNC: 68.9 MIU/ML
HBV SURFACE AG SER QL: NONREACTIVE
HCT VFR BLD AUTO: 44.1 % (ref 35.8–46.3)
HCV AB SER QL: NONREACTIVE
HGB BLD-MCNC: 14.8 G/DL (ref 11.7–15.4)
HIV 1+2 AB+HIV1 P24 AG SERPL QL IA: NONREACTIVE
HIV 1/2 RESULT COMMENT: NORMAL
IMM GRANULOCYTES # BLD AUTO: 0 K/UL (ref 0–0.5)
IMM GRANULOCYTES NFR BLD AUTO: 0 % (ref 0–5)
LYMPHOCYTES # BLD: 2.6 K/UL (ref 0.5–4.6)
LYMPHOCYTES NFR BLD: 34 % (ref 13–44)
MCH RBC QN AUTO: 29.8 PG (ref 26.1–32.9)
MCHC RBC AUTO-ENTMCNC: 33.6 G/DL (ref 31.4–35)
MCV RBC AUTO: 88.9 FL (ref 82–102)
MONOCYTES # BLD: 0.5 K/UL (ref 0.1–1.3)
MONOCYTES NFR BLD: 6 % (ref 4–12)
NEUTS SEG # BLD: 4.5 K/UL (ref 1.7–8.2)
NEUTS SEG NFR BLD: 57 % (ref 43–78)
NRBC # BLD: 0 K/UL (ref 0–0.2)
PLATELET # BLD AUTO: 256 K/UL (ref 150–450)
PMV BLD AUTO: 10.3 FL (ref 9.4–12.3)
POTASSIUM SERPL-SCNC: 4.3 MMOL/L (ref 3.5–5.1)
PROT SERPL-MCNC: 7 G/DL (ref 6.3–8.2)
RBC # BLD AUTO: 4.96 M/UL (ref 4.05–5.2)
SODIUM SERPL-SCNC: 141 MMOL/L (ref 136–145)
WBC # BLD AUTO: 7.8 K/UL (ref 4.3–11.1)

## 2024-05-10 PROCEDURE — 99215 OFFICE O/P EST HI 40 MIN: CPT | Performed by: NURSE PRACTITIONER

## 2024-05-10 RX ORDER — ACETAMINOPHEN 325 MG/1
650 TABLET ORAL ONCE
OUTPATIENT
Start: 2024-05-10 | End: 2024-05-10

## 2024-05-10 RX ORDER — DIPHENHYDRAMINE HYDROCHLORIDE 50 MG/ML
25 INJECTION INTRAMUSCULAR; INTRAVENOUS ONCE
OUTPATIENT
Start: 2024-05-10 | End: 2024-05-10

## 2024-05-10 RX ORDER — SODIUM CHLORIDE 9 MG/ML
5-250 INJECTION, SOLUTION INTRAVENOUS PRN
OUTPATIENT
Start: 2024-05-10

## 2024-05-10 RX ORDER — ONDANSETRON 2 MG/ML
8 INJECTION INTRAMUSCULAR; INTRAVENOUS
OUTPATIENT
Start: 2024-05-10

## 2024-05-10 RX ORDER — VENLAFAXINE HYDROCHLORIDE 37.5 MG/1
37.5 CAPSULE, EXTENDED RELEASE ORAL DAILY
Qty: 30 CAPSULE | Refills: 11 | Status: SHIPPED | OUTPATIENT
Start: 2024-05-10

## 2024-05-10 RX ORDER — FAMOTIDINE 20 MG/1
20 TABLET, FILM COATED ORAL ONCE
OUTPATIENT
Start: 2024-05-10 | End: 2024-05-10

## 2024-05-10 RX ORDER — SODIUM CHLORIDE 0.9 % (FLUSH) 0.9 %
5-40 SYRINGE (ML) INJECTION PRN
OUTPATIENT
Start: 2024-05-10

## 2024-05-10 RX ORDER — FAMOTIDINE 10 MG/ML
20 INJECTION, SOLUTION INTRAVENOUS
OUTPATIENT
Start: 2024-05-10

## 2024-05-10 RX ORDER — RIZATRIPTAN BENZOATE 10 MG/1
TABLET ORAL
Qty: 9 TABLET | Refills: 11 | Status: SHIPPED | OUTPATIENT
Start: 2024-05-10

## 2024-05-10 RX ORDER — DIPHENHYDRAMINE HYDROCHLORIDE 50 MG/ML
50 INJECTION INTRAMUSCULAR; INTRAVENOUS
OUTPATIENT
Start: 2024-05-10

## 2024-05-10 RX ORDER — FREMANEZUMAB-VFRM 225 MG/1.5ML
225 INJECTION SUBCUTANEOUS
Qty: 4.5 ML | Refills: 4 | Status: SHIPPED | OUTPATIENT
Start: 2024-05-10 | End: 2024-06-09

## 2024-05-10 RX ORDER — ALBUTEROL SULFATE 90 UG/1
4 AEROSOL, METERED RESPIRATORY (INHALATION) PRN
OUTPATIENT
Start: 2024-05-10

## 2024-05-10 RX ORDER — ACETAMINOPHEN 325 MG/1
650 TABLET ORAL
OUTPATIENT
Start: 2024-05-10

## 2024-05-10 RX ORDER — HEPARIN SODIUM (PORCINE) LOCK FLUSH IV SOLN 100 UNIT/ML 100 UNIT/ML
500 SOLUTION INTRAVENOUS PRN
OUTPATIENT
Start: 2024-05-10

## 2024-05-10 RX ORDER — SODIUM CHLORIDE 9 MG/ML
INJECTION, SOLUTION INTRAVENOUS CONTINUOUS
OUTPATIENT
Start: 2024-05-10

## 2024-05-10 RX ORDER — EPINEPHRINE 1 MG/ML
0.3 INJECTION, SOLUTION, CONCENTRATE INTRAVENOUS PRN
OUTPATIENT
Start: 2024-05-10

## 2024-05-10 RX ORDER — FREMANEZUMAB-VFRM 225 MG/1.5ML
22 INJECTION SUBCUTANEOUS
COMMUNITY
End: 2024-05-10 | Stop reason: SDUPTHER

## 2024-05-10 ASSESSMENT — PATIENT HEALTH QUESTIONNAIRE - PHQ9
1. LITTLE INTEREST OR PLEASURE IN DOING THINGS: NOT AT ALL
SUM OF ALL RESPONSES TO PHQ QUESTIONS 1-9: 0
SUM OF ALL RESPONSES TO PHQ QUESTIONS 1-9: 0
DEPRESSION UNABLE TO ASSESS: FUNCTIONAL CAPACITY MOTIVATION LIMITS ACCURACY
2. FEELING DOWN, DEPRESSED OR HOPELESS: NOT AT ALL
SUM OF ALL RESPONSES TO PHQ QUESTIONS 1-9: 0
SUM OF ALL RESPONSES TO PHQ9 QUESTIONS 1 & 2: 0
SUM OF ALL RESPONSES TO PHQ QUESTIONS 1-9: 0

## 2024-05-10 ASSESSMENT — ENCOUNTER SYMPTOMS
NAUSEA: 1
PHOTOPHOBIA: 1
VOMITING: 1

## 2024-05-10 NOTE — PROGRESS NOTES
History:   Procedure Laterality Date    BILATERAL SALPINGOOPHORECTOMY  2022    HYSTERECTOMY (CERVIX STATUS UNKNOWN)      LAPAROSCOPIC HYSTERECTOMY  2022    HYSTERECTOMY VAGINAL ASSISTED LAPAROSCOPIC (LAVH) BILATERAL SALPINGECTOMY    TLH AND BSO (CERVIX REMOVED)  2022    TONSILLECTOMY      TUBAL LIGATION       Family History   Problem Relation Age of Onset    Thyroid Disease Mother     Elevated Lipids Mother     High Cholesterol Mother     Hypertension Father     No Known Problems Sister     No Known Problems Sister     Heart Disease Maternal Grandmother     Diabetes Paternal Grandmother     Heart Attack Paternal Grandmother     Elevated Lipids Father        Current Medications:  Current Outpatient Medications   Medication Sig Dispense Refill    venlafaxine (EFFEXOR XR) 37.5 MG extended release capsule Take 1 capsule by mouth daily 30 capsule 11    Fremanezumab-vfrm (AJOVY) 225 MG/1.5ML SOAJ Inject 225 mg into the skin every 30 days 4.5 mL 4    rizatriptan (MAXALT) 10 MG tablet May repeat in 2 hours if needed, max dose of two tablets in 24 hours 9 tablet 11    VITAMIN D PO Take by mouth       No current facility-administered medications for this visit.       Allergies:  Allergies   Allergen Reactions    Fish-Derived Products        Social History:  Social History     Socioeconomic History    Marital status:      Spouse name: None    Number of children: None    Years of education: None    Highest education level: None   Tobacco Use    Smoking status: Former     Current packs/day: 0.00     Types: Cigarettes     Quit date: 1/10/2022     Years since quittin.3    Smokeless tobacco: Never   Substance and Sexual Activity    Alcohol use: Not Currently    Drug use: Never     Social Determinants of Health     Financial Resource Strain: Low Risk  (3/26/2024)    Overall Financial Resource Strain (CARDIA)     Difficulty of Paying Living Expenses: Not hard at all   Food Insecurity: No Food Insecurity

## 2024-05-10 NOTE — ASSESSMENT & PLAN NOTE
Continue Ajovy for migraine prevention.  Rizatriptan at onset of migraine.  No more than twice per week

## 2024-05-10 NOTE — ASSESSMENT & PLAN NOTE
Start venlafaxine 37.5 mg 1 tablet by mouth once daily in the morning for anxiety as well as fatigue management associated with MS.  Referral to counseling

## 2024-05-10 NOTE — ASSESSMENT & PLAN NOTE
Lesions located to periventricular brain, juxtacortical white matter and infratentorial brain along with C-spine lesions.  Patient is NMO negative.  IgG and IgG synthesis rate elevated in CSF.  Negative for oligoclonal bands.       Start Ocrevus for DMT.       Avoid injectable options in this patient as she has had injection site reactions previously    Due for pre-Ocrevus labs, ordered today.

## 2024-05-10 NOTE — PROGRESS NOTES
Pt completed the Ocrevus start forms in the office and they have been faxed to [employer] @[phone]

## 2024-05-11 LAB
HBV CORE AB SERPL QL IA: NEGATIVE
HBV CORE IGM SERPL QL IA: NEGATIVE

## 2024-05-12 LAB
IGA SERPL-MCNC: 117 MG/DL (ref 87–352)
IGG SERPL-MCNC: 724 MG/DL (ref 586–1602)
IGM SERPL-MCNC: 105 MG/DL (ref 26–217)
VZV IGG SER IA-ACNC: 2771 INDEX

## 2024-05-16 LAB
M TB IFN-G BLD-IMP: NEGATIVE
M TB IFN-G CD4+ T-CELLS BLD-ACNC: 0.05 IU/ML
M TBIFN-G CD4+ CD8+T-CELLS BLD-ACNC: 0.06 IU/ML
QUANTIFERON CRITERIA: NORMAL
QUANTIFERON MITOGEN VALUE: >10 IU/ML
QUANTIFERON NIL VALUE: 0.04 IU/ML

## 2024-05-30 LAB
M TB IFN-G CD4+ T-CELLS BLD-ACNC: NORMAL IU/ML
M TBIFN-G CD4+ CD8+T-CELLS BLD-ACNC: NORMAL IU/ML
QUANTIFERON CRITERIA: NORMAL
QUANTIFERON MITOGEN VALUE: NORMAL IU/ML
QUANTIFERON NIL VALUE: NORMAL IU/ML

## 2024-06-03 RX ORDER — VENLAFAXINE HYDROCHLORIDE 37.5 MG/1
CAPSULE, EXTENDED RELEASE ORAL DAILY
Qty: 90 CAPSULE | Refills: 4 | OUTPATIENT
Start: 2024-06-03

## 2024-06-10 ENCOUNTER — OFFICE VISIT (OUTPATIENT)
Dept: UROLOGY | Age: 39
End: 2024-06-10
Payer: OTHER GOVERNMENT

## 2024-06-10 DIAGNOSIS — R35.0 URINARY FREQUENCY: ICD-10-CM

## 2024-06-10 DIAGNOSIS — N32.81 OAB (OVERACTIVE BLADDER): Primary | ICD-10-CM

## 2024-06-10 LAB
BILIRUBIN, URINE, POC: NEGATIVE
BLOOD URINE, POC: NORMAL
GLUCOSE URINE, POC: NEGATIVE
KETONES, URINE, POC: NEGATIVE
LEUKOCYTE ESTERASE, URINE, POC: NEGATIVE
NITRITE, URINE, POC: NEGATIVE
PH, URINE, POC: 5.5 (ref 4.6–8)
PROTEIN,URINE, POC: NEGATIVE
SPECIFIC GRAVITY, URINE, POC: 1.02 (ref 1–1.03)
URINALYSIS CLARITY, POC: NORMAL
URINALYSIS COLOR, POC: NORMAL
UROBILINOGEN, POC: NORMAL

## 2024-06-10 PROCEDURE — 81003 URINALYSIS AUTO W/O SCOPE: CPT | Performed by: NURSE PRACTITIONER

## 2024-06-10 PROCEDURE — 99204 OFFICE O/P NEW MOD 45 MIN: CPT | Performed by: NURSE PRACTITIONER

## 2024-06-10 RX ORDER — VIBEGRON 75 MG/1
75 TABLET, FILM COATED ORAL DAILY
Qty: 30 TABLET | Refills: 11 | Status: SHIPPED | OUTPATIENT
Start: 2024-06-10

## 2024-06-10 ASSESSMENT — ENCOUNTER SYMPTOMS
EYE PAIN: 0
SHORTNESS OF BREATH: 0
BLOOD IN STOOL: 0
EYE DISCHARGE: 0
BACK PAIN: 0
CONSTIPATION: 0
WHEEZING: 0
DIARRHEA: 0
INDIGESTION: 0
SKIN LESIONS: 0
HEARTBURN: 0
ABDOMINAL PAIN: 0
VOMITING: 0
NAUSEA: 0
COUGH: 0

## 2024-06-10 NOTE — PROGRESS NOTES
[facility]  [address] [place] [id number]  [phone]          Heidi Mcclelland  : 1985    Chief Complaint   Patient presents with    New Patient          HPI     Heidi Mcclelland is a 39 y.o. female  Here today referred by neurology secondary to urinary urgency frequency and feeling of incomplete emptying.  Patient has significant history of MS.  This currently is controlled with medication therapy and infusions.  She tells me that first thing in the morning that she can go back and forth to the bathroom about every 15 minutes.  This goes on for couple of hours.  During the day the urination slows down to about once an hour but just before bed she says she is up at least 3 times before she feels that her bladder is completely empty.  A PVR today in the office reveals a bladder to only have 36 mL.    Once she is asleep she does not have to get up to urinate.  She is not experiencing any urinary incontinence.    Significant history of 1 pregnancy with  delivery.  This was in .  She underwent a hysterectomy in  secondary to positive HPV and endometriosis.    She is here today to discuss symptoms and any treatment options available to her.        Past Medical History:   Diagnosis Date    Abnormal Papanicolaou smear of cervix     ASCUS HPV Positive    Anxiety     Depression     Former smoker     Migraines     Nausea & vomiting     post-op N/V     Past Surgical History:   Procedure Laterality Date    BILATERAL SALPINGOOPHORECTOMY  2022     SECTION  2016    HYSTERECTOMY (CERVIX STATUS UNKNOWN)      LAPAROSCOPIC HYSTERECTOMY  2022    HYSTERECTOMY VAGINAL ASSISTED LAPAROSCOPIC (LAVH) BILATERAL SALPINGECTOMY    TLH AND BSO (CERVIX REMOVED)  2022    TONSILLECTOMY      TUBAL LIGATION       Current Outpatient Medications   Medication Sig Dispense Refill    vibegron (GEMTESA) 75 MG TABS tablet Take 1 tablet by mouth daily 30 tablet 11    venlafaxine

## 2024-06-19 RX ORDER — VIBEGRON 75 MG/1
75 TABLET, FILM COATED ORAL DAILY
Qty: 30 TABLET | Refills: 11 | Status: SHIPPED | OUTPATIENT
Start: 2024-06-19

## 2024-06-21 ENCOUNTER — NURSE ONLY (OUTPATIENT)
Age: 39
End: 2024-06-21

## 2024-06-21 VITALS
TEMPERATURE: 98.8 F | HEART RATE: 72 BPM | DIASTOLIC BLOOD PRESSURE: 81 MMHG | SYSTOLIC BLOOD PRESSURE: 127 MMHG | RESPIRATION RATE: 18 BRPM

## 2024-06-21 DIAGNOSIS — G35 MULTIPLE SCLEROSIS (HCC): Primary | ICD-10-CM

## 2024-06-21 RX ORDER — DIPHENHYDRAMINE HYDROCHLORIDE 50 MG/ML
25 INJECTION INTRAMUSCULAR; INTRAVENOUS ONCE
OUTPATIENT
Start: 2024-07-05 | End: 2024-07-05

## 2024-06-21 RX ORDER — SODIUM CHLORIDE 0.9 % (FLUSH) 0.9 %
5-40 SYRINGE (ML) INJECTION PRN
Status: DISCONTINUED | OUTPATIENT
Start: 2024-06-21 | End: 2024-06-21 | Stop reason: HOSPADM

## 2024-06-21 RX ORDER — SODIUM CHLORIDE 9 MG/ML
5-250 INJECTION, SOLUTION INTRAVENOUS PRN
OUTPATIENT
Start: 2024-07-05

## 2024-06-21 RX ORDER — FAMOTIDINE 20 MG/1
20 TABLET, FILM COATED ORAL ONCE
OUTPATIENT
Start: 2024-07-05 | End: 2024-07-05

## 2024-06-21 RX ORDER — ONDANSETRON 2 MG/ML
8 INJECTION INTRAMUSCULAR; INTRAVENOUS
OUTPATIENT
Start: 2024-07-05

## 2024-06-21 RX ORDER — SODIUM CHLORIDE 9 MG/ML
INJECTION, SOLUTION INTRAVENOUS CONTINUOUS
Status: DISCONTINUED | OUTPATIENT
Start: 2024-06-21 | End: 2024-06-21 | Stop reason: HOSPADM

## 2024-06-21 RX ORDER — ACETAMINOPHEN 325 MG/1
650 TABLET ORAL ONCE
OUTPATIENT
Start: 2024-07-05 | End: 2024-07-05

## 2024-06-21 RX ORDER — SODIUM CHLORIDE 0.9 % (FLUSH) 0.9 %
5-40 SYRINGE (ML) INJECTION PRN
OUTPATIENT
Start: 2024-07-05

## 2024-06-21 RX ORDER — ACETAMINOPHEN 325 MG/1
650 TABLET ORAL
Status: DISCONTINUED | OUTPATIENT
Start: 2024-06-21 | End: 2024-06-21 | Stop reason: HOSPADM

## 2024-06-21 RX ORDER — ACETAMINOPHEN 325 MG/1
650 TABLET ORAL
OUTPATIENT
Start: 2024-07-05

## 2024-06-21 RX ORDER — SODIUM CHLORIDE 9 MG/ML
5-250 INJECTION, SOLUTION INTRAVENOUS PRN
Status: DISCONTINUED | OUTPATIENT
Start: 2024-06-21 | End: 2024-06-21 | Stop reason: HOSPADM

## 2024-06-21 RX ORDER — ALBUTEROL SULFATE 90 UG/1
4 AEROSOL, METERED RESPIRATORY (INHALATION) PRN
Status: DISCONTINUED | OUTPATIENT
Start: 2024-06-21 | End: 2024-06-21 | Stop reason: HOSPADM

## 2024-06-21 RX ORDER — DIPHENHYDRAMINE HYDROCHLORIDE 50 MG/ML
25 INJECTION INTRAMUSCULAR; INTRAVENOUS ONCE
Status: COMPLETED | OUTPATIENT
Start: 2024-06-21 | End: 2024-06-21

## 2024-06-21 RX ORDER — ALBUTEROL SULFATE 90 UG/1
4 AEROSOL, METERED RESPIRATORY (INHALATION) PRN
OUTPATIENT
Start: 2024-07-05

## 2024-06-21 RX ORDER — EPINEPHRINE 1 MG/ML
0.3 INJECTION, SOLUTION, CONCENTRATE INTRAVENOUS PRN
Status: DISCONTINUED | OUTPATIENT
Start: 2024-06-21 | End: 2024-06-21 | Stop reason: HOSPADM

## 2024-06-21 RX ORDER — DIPHENHYDRAMINE HYDROCHLORIDE 50 MG/ML
50 INJECTION INTRAMUSCULAR; INTRAVENOUS
Status: DISCONTINUED | OUTPATIENT
Start: 2024-06-21 | End: 2024-06-21 | Stop reason: HOSPADM

## 2024-06-21 RX ORDER — ONDANSETRON 2 MG/ML
8 INJECTION INTRAMUSCULAR; INTRAVENOUS
Status: DISCONTINUED | OUTPATIENT
Start: 2024-06-21 | End: 2024-06-21 | Stop reason: HOSPADM

## 2024-06-21 RX ORDER — ACETAMINOPHEN 325 MG/1
650 TABLET ORAL ONCE
Status: COMPLETED | OUTPATIENT
Start: 2024-06-21 | End: 2024-06-21

## 2024-06-21 RX ORDER — SODIUM CHLORIDE 9 MG/ML
INJECTION, SOLUTION INTRAVENOUS CONTINUOUS
OUTPATIENT
Start: 2024-07-05

## 2024-06-21 RX ORDER — DIPHENHYDRAMINE HYDROCHLORIDE 50 MG/ML
50 INJECTION INTRAMUSCULAR; INTRAVENOUS
OUTPATIENT
Start: 2024-07-05

## 2024-06-21 RX ORDER — HEPARIN 100 UNIT/ML
500 SYRINGE INTRAVENOUS PRN
Status: DISCONTINUED | OUTPATIENT
Start: 2024-06-21 | End: 2024-06-21 | Stop reason: HOSPADM

## 2024-06-21 RX ORDER — HEPARIN 100 UNIT/ML
500 SYRINGE INTRAVENOUS PRN
OUTPATIENT
Start: 2024-07-05

## 2024-06-21 RX ORDER — FAMOTIDINE 20 MG/1
20 TABLET, FILM COATED ORAL ONCE
Status: COMPLETED | OUTPATIENT
Start: 2024-06-21 | End: 2024-06-21

## 2024-06-21 RX ORDER — EPINEPHRINE 1 MG/ML
0.3 INJECTION, SOLUTION, CONCENTRATE INTRAVENOUS PRN
OUTPATIENT
Start: 2024-07-05

## 2024-06-21 RX ADMIN — DIPHENHYDRAMINE HYDROCHLORIDE 25 MG: 50 INJECTION INTRAMUSCULAR; INTRAVENOUS at 09:15

## 2024-06-21 RX ADMIN — ACETAMINOPHEN 650 MG: 325 TABLET ORAL at 09:12

## 2024-06-21 RX ADMIN — FAMOTIDINE 20 MG: 20 TABLET, FILM COATED ORAL at 09:16

## 2024-06-21 NOTE — PROGRESS NOTES
[facility]  [address], [address]  [place] [address]  Office : [phone], Fax: [phone]       Pre-Infusion Questions      1.    Has your insurance changed or have you received a new card since your last visit?  No  2.    Have you had an infection since your last infusion?  No  3.    Have you recently had GI problems, open wounds, urinary problems, or chest pain?  No  4.    Are you currently taking antibiotics?  5.    No  6.    Have you recently had or are you scheduled for any surgical procedures?  No  7.    Did you take an antihistamine prior to arrival today?  No  8.    Have you received any vaccinations recently?  No  9.    When was your last appointment with one of our providers?  05/10/2024  10. When was your last infusion?        This is the first Ocrevus infusion.  12. Are you in a skilled nursing facility?        No      Patient arrived ambulatory to infusion suite today for IV Ocrevus infusion. Vital signs WNL. No contraindications noted. 22g 1\" IV catheter placed in pathient's left hand. Patient tolerated well.     Premedication given as ordered. 1hr wait prior to starting infusion. Ocrevus administered per orders and per protocol. 2.2 Micron inline filter used. Infusion time 2 hours, 40 minutes.     Patient tolerated the infusion well. No complications noted during infusion. Patient offered pillow, warm blanket, drink and snacks during visit. Up ad shana to restroom.    Pt observed for 1 hour after completion of infusion without complications.    Patient ambulatory out of infusion suite, discharged feeling well. Patient instructed to call the ordering provider with any post-infusion issues.    1030-30cc/hr  1100-60cc/hr  1130-90cc/hr  1200-120cc/hr  1230-150cc/hr  1300-180cc/hr  Stop time 1350

## 2024-06-25 RX ORDER — FREMANEZUMAB-VFRM 225 MG/1.5ML
225 INJECTION SUBCUTANEOUS
Qty: 4.5 ML | Refills: 1 | Status: SHIPPED | OUTPATIENT
Start: 2024-06-25

## 2024-06-25 RX ORDER — VENLAFAXINE HYDROCHLORIDE 37.5 MG/1
37.5 CAPSULE, EXTENDED RELEASE ORAL DAILY
Qty: 90 CAPSULE | Refills: 1 | Status: SHIPPED | OUTPATIENT
Start: 2024-06-25

## 2024-06-26 ENCOUNTER — TELEPHONE (OUTPATIENT)
Dept: UROLOGY | Age: 39
End: 2024-06-26

## 2024-06-26 NOTE — TELEPHONE ENCOUNTER
Patient's PA for Gemtesa denied through the Epic system due to having a low eGFR. However, per the patient's labwork, she does not have a low eGFR. Appeal letter written and copy of labs printed along with copy of denial letter and consent form to be signed by patient, to give us permission to proceed with the appeal for . Spoke with patient, explained the situation. She will stop by the office later this afternoon to sign the form. Afterwards, this will be faxed and the appeal process will be initiated.

## 2024-07-08 ENCOUNTER — NURSE ONLY (OUTPATIENT)
Age: 39
End: 2024-07-08
Payer: OTHER GOVERNMENT

## 2024-07-08 VITALS
OXYGEN SATURATION: 99 % | HEART RATE: 74 BPM | DIASTOLIC BLOOD PRESSURE: 84 MMHG | TEMPERATURE: 97.3 F | RESPIRATION RATE: 17 BRPM | SYSTOLIC BLOOD PRESSURE: 147 MMHG

## 2024-07-08 DIAGNOSIS — G35 MULTIPLE SCLEROSIS (HCC): Primary | ICD-10-CM

## 2024-07-08 PROCEDURE — 96365 THER/PROPH/DIAG IV INF INIT: CPT | Performed by: PSYCHIATRY & NEUROLOGY

## 2024-07-08 PROCEDURE — 96375 TX/PRO/DX INJ NEW DRUG ADDON: CPT | Performed by: PSYCHIATRY & NEUROLOGY

## 2024-07-08 PROCEDURE — 96366 THER/PROPH/DIAG IV INF ADDON: CPT | Performed by: PSYCHIATRY & NEUROLOGY

## 2024-07-08 RX ORDER — ALBUTEROL SULFATE 90 UG/1
4 AEROSOL, METERED RESPIRATORY (INHALATION) PRN
OUTPATIENT
Start: 2024-07-15

## 2024-07-08 RX ORDER — DIPHENHYDRAMINE HYDROCHLORIDE 50 MG/ML
25 INJECTION INTRAMUSCULAR; INTRAVENOUS ONCE
Status: COMPLETED | OUTPATIENT
Start: 2024-07-08 | End: 2024-07-08

## 2024-07-08 RX ORDER — EPINEPHRINE 1 MG/ML
0.3 INJECTION, SOLUTION, CONCENTRATE INTRAVENOUS PRN
OUTPATIENT
Start: 2024-07-15

## 2024-07-08 RX ORDER — EPINEPHRINE 1 MG/ML
0.3 INJECTION, SOLUTION, CONCENTRATE INTRAVENOUS PRN
Status: DISCONTINUED | OUTPATIENT
Start: 2024-07-08 | End: 2024-07-08 | Stop reason: HOSPADM

## 2024-07-08 RX ORDER — SODIUM CHLORIDE 9 MG/ML
5-250 INJECTION, SOLUTION INTRAVENOUS PRN
OUTPATIENT
Start: 2024-07-15

## 2024-07-08 RX ORDER — FAMOTIDINE 20 MG/1
20 TABLET, FILM COATED ORAL ONCE
Status: CANCELLED | OUTPATIENT
Start: 2024-07-15 | End: 2024-07-15

## 2024-07-08 RX ORDER — SODIUM CHLORIDE 0.9 % (FLUSH) 0.9 %
5-40 SYRINGE (ML) INJECTION PRN
OUTPATIENT
Start: 2024-07-15

## 2024-07-08 RX ORDER — FAMOTIDINE 20 MG/1
20 TABLET, FILM COATED ORAL ONCE
OUTPATIENT
Start: 2024-07-15 | End: 2024-07-15

## 2024-07-08 RX ORDER — ONDANSETRON 2 MG/ML
8 INJECTION INTRAMUSCULAR; INTRAVENOUS
OUTPATIENT
Start: 2024-07-15

## 2024-07-08 RX ORDER — HEPARIN 100 UNIT/ML
500 SYRINGE INTRAVENOUS PRN
OUTPATIENT
Start: 2024-07-15

## 2024-07-08 RX ORDER — SODIUM CHLORIDE 9 MG/ML
5-250 INJECTION, SOLUTION INTRAVENOUS PRN
Status: DISCONTINUED | OUTPATIENT
Start: 2024-07-08 | End: 2024-07-08 | Stop reason: HOSPADM

## 2024-07-08 RX ORDER — DIPHENHYDRAMINE HYDROCHLORIDE 50 MG/ML
50 INJECTION INTRAMUSCULAR; INTRAVENOUS
OUTPATIENT
Start: 2024-07-15

## 2024-07-08 RX ORDER — ACETAMINOPHEN 325 MG/1
650 TABLET ORAL ONCE
Status: CANCELLED | OUTPATIENT
Start: 2024-07-15 | End: 2024-07-15

## 2024-07-08 RX ORDER — DIPHENHYDRAMINE HYDROCHLORIDE 50 MG/ML
25 INJECTION INTRAMUSCULAR; INTRAVENOUS ONCE
Status: CANCELLED | OUTPATIENT
Start: 2024-07-15 | End: 2024-07-15

## 2024-07-08 RX ORDER — FAMOTIDINE 20 MG/1
20 TABLET, FILM COATED ORAL ONCE
Status: COMPLETED | OUTPATIENT
Start: 2024-07-08 | End: 2024-07-08

## 2024-07-08 RX ORDER — DIPHENHYDRAMINE HYDROCHLORIDE 50 MG/ML
25 INJECTION INTRAMUSCULAR; INTRAVENOUS ONCE
OUTPATIENT
Start: 2024-07-15 | End: 2024-07-15

## 2024-07-08 RX ORDER — SODIUM CHLORIDE 9 MG/ML
INJECTION, SOLUTION INTRAVENOUS CONTINUOUS
OUTPATIENT
Start: 2024-07-15

## 2024-07-08 RX ORDER — ACETAMINOPHEN 325 MG/1
650 TABLET ORAL
OUTPATIENT
Start: 2024-07-15

## 2024-07-08 RX ORDER — ACETAMINOPHEN 325 MG/1
650 TABLET ORAL
Status: DISCONTINUED | OUTPATIENT
Start: 2024-07-08 | End: 2024-07-08 | Stop reason: HOSPADM

## 2024-07-08 RX ORDER — SODIUM CHLORIDE 0.9 % (FLUSH) 0.9 %
5-40 SYRINGE (ML) INJECTION PRN
Status: DISCONTINUED | OUTPATIENT
Start: 2024-07-08 | End: 2024-07-08 | Stop reason: HOSPADM

## 2024-07-08 RX ORDER — ACETAMINOPHEN 325 MG/1
650 TABLET ORAL ONCE
OUTPATIENT
Start: 2024-07-15 | End: 2024-07-15

## 2024-07-08 RX ORDER — ACETAMINOPHEN 325 MG/1
650 TABLET ORAL ONCE
Status: COMPLETED | OUTPATIENT
Start: 2024-07-08 | End: 2024-07-08

## 2024-07-08 RX ORDER — HEPARIN 100 UNIT/ML
500 SYRINGE INTRAVENOUS PRN
Status: DISCONTINUED | OUTPATIENT
Start: 2024-07-08 | End: 2024-07-08 | Stop reason: HOSPADM

## 2024-07-08 RX ORDER — ALBUTEROL SULFATE 90 UG/1
4 AEROSOL, METERED RESPIRATORY (INHALATION) PRN
Status: DISCONTINUED | OUTPATIENT
Start: 2024-07-08 | End: 2024-07-08 | Stop reason: HOSPADM

## 2024-07-08 RX ORDER — SODIUM CHLORIDE 9 MG/ML
INJECTION, SOLUTION INTRAVENOUS CONTINUOUS
Status: DISCONTINUED | OUTPATIENT
Start: 2024-07-08 | End: 2024-07-08 | Stop reason: HOSPADM

## 2024-07-08 RX ORDER — DIPHENHYDRAMINE HYDROCHLORIDE 50 MG/ML
50 INJECTION INTRAMUSCULAR; INTRAVENOUS
Status: DISCONTINUED | OUTPATIENT
Start: 2024-07-08 | End: 2024-07-08 | Stop reason: HOSPADM

## 2024-07-08 RX ORDER — ONDANSETRON 2 MG/ML
8 INJECTION INTRAMUSCULAR; INTRAVENOUS
Status: DISCONTINUED | OUTPATIENT
Start: 2024-07-08 | End: 2024-07-08 | Stop reason: HOSPADM

## 2024-07-08 RX ADMIN — ACETAMINOPHEN 650 MG: 325 TABLET ORAL at 09:42

## 2024-07-08 RX ADMIN — FAMOTIDINE 20 MG: 20 TABLET, FILM COATED ORAL at 09:30

## 2024-07-08 RX ADMIN — DIPHENHYDRAMINE HYDROCHLORIDE 25 MG: 50 INJECTION INTRAMUSCULAR; INTRAVENOUS at 09:30

## 2024-07-08 NOTE — PROGRESS NOTES
[facility]  [address], [address]  [place] [address]  Office : [phone], Fax: [phone]       Pre-Infusion Questions      1.    Has your insurance changed or have you received a new card since your last visit?  No  2.    Have you had an infection since your last infusion?  No  3.    Have you recently had GI problems, open wounds, urinary problems, or chest pain?  No  4.    Are you currently taking antibiotics?  5.    No  6.    Have you recently had or are you scheduled for any surgical procedures?  No  7.    Did you take an antihistamine prior to arrival today?  No  8.    Have you received any vaccinations recently?  No  9.    When was your last appointment with one of our providers?  06/21/2024  10.   When was your last infusion?        06/21/2024  12. Are you in a skilled nursing facility?        No      Patient arrived ambulatory to infusion suite today for IV Ocrevus infusion. Vital signs WNL. No contraindications noted. 22g 1\" IV catheter placed in patient's left hand x 1 attempt. Patient tolerated well. Infusion time 2 hours, 45 minutes.    Premedication given as ordered. 1hr wait prior to starting infusion. Ocrevus administered per orders and per protocol. 2.2 Micron inline filter used. Infusion time  hour,   minutes.     Patient tolerated the infusion well. No complications noted during infusion. Patient offered pillow, warm blanket, drink and snacks during visit. Up ad shana to restroom.    Pt observed for 1 hour after completion of infusion without complications.    Patient ambulatory out of infusion suite, discharged feeling well. Patient instructed to call the ordering provider with any post-infusion issues.    Next infusion scheduled with appointment desk prior to departure.

## 2024-09-19 RX ORDER — RIZATRIPTAN BENZOATE 10 MG/1
TABLET ORAL
Qty: 9 TABLET | Refills: 11 | Status: SHIPPED | OUTPATIENT
Start: 2024-09-19

## 2025-01-28 RX ORDER — FREMANEZUMAB-VFRM 225 MG/1.5ML
INJECTION SUBCUTANEOUS
Qty: 4.5 ML | Refills: 1 | Status: SHIPPED | OUTPATIENT
Start: 2025-01-28

## 2025-02-27 ENCOUNTER — TELEPHONE (OUTPATIENT)
Dept: NEUROLOGY | Age: 40
End: 2025-02-27

## 2025-06-18 RX ORDER — FREMANEZUMAB-VFRM 225 MG/1.5ML
INJECTION SUBCUTANEOUS
Qty: 4.5 ML | Refills: 3 | Status: SHIPPED | OUTPATIENT
Start: 2025-06-18

## (undated) DEVICE — YANKAUER,BULB TIP,W/O VENT,RIGID,STERILE: Brand: MEDLINE

## (undated) DEVICE — INSUFFLATION NEEDLE TO ESTABLISH PNEUMOPERITONEUM.: Brand: INSUFFLATION NEEDLE

## (undated) DEVICE — REM POLYHESIVE ADULT PATIENT RETURN ELECTRODE: Brand: VALLEYLAB

## (undated) DEVICE — CANISTER, RIGID, 2000CC: Brand: MEDLINE INDUSTRIES, INC.

## (undated) DEVICE — SURGICEL ENDOSCP APPL

## (undated) DEVICE — Device

## (undated) DEVICE — CONTAINER SPEC HISTOLOGY 900ML POLYPR

## (undated) DEVICE — SUTURE VCRL SZ 0 L27IN ABSRB UD L36MM CT-1 1/2 CIR J260H

## (undated) DEVICE — TUBING, SUCTION, 1/4" X 10', STRAIGHT: Brand: MEDLINE

## (undated) DEVICE — SOLUTION IV 1000ML 0.9% SOD CHL

## (undated) DEVICE — POWDER HEMOSTAT GEL 3.0GR -- SURGICEL

## (undated) DEVICE — TOTAL TRAY, DB, 100% SILI FOLEY, 16FR 10: Brand: MEDLINE

## (undated) DEVICE — 2, DISPOSABLE SUCTION/IRRIGATOR WITHOUT DISPOSABLE TIP: Brand: STRYKEFLOW

## (undated) DEVICE — 40585 XL ADVANCED TRENDELENBURG POSITIONING KIT: Brand: 40585 XL ADVANCED TRENDELENBURG POSITIONING KIT

## (undated) DEVICE — TUBING INSUFFLATION SMK EVAC HI FLO SET PNEUMOCLEAR

## (undated) DEVICE — SYRINGE IRRIG 60ML SFT PLIABLE BLB EZ TO GRP 1 HND USE W/

## (undated) DEVICE — LAPAROSCOPIC TROCAR SLEEVE/SINGLE USE: Brand: KII® OPTICAL ACCESS SYSTEM

## (undated) DEVICE — GARMENT,MEDLINE,DVT,INT,CALF,MED, GEN2: Brand: MEDLINE

## (undated) DEVICE — Z DUPLICATE USE 2847003 INJECTOR UTER

## (undated) DEVICE — DERMABOND SKIN ADH 0.7ML -- DERMABOND ADVANCED 12/BX

## (undated) DEVICE — DRAPE SHT 3 QTR PROXIMA 53X77 --

## (undated) DEVICE — SUTURE MCRYL SZ 4-0 L27IN ABSRB UD L19MM PS-2 1/2 CIR PRIM Y426H

## (undated) DEVICE — SEALER LAP L37CM SHFT DIA10MM TISS FUS HAND/FOOT SWCH BLNT

## (undated) DEVICE — TROCAR: Brand: KII FIOS FIRST ENTRY

## (undated) DEVICE — GYN LAPAROSCOPY: Brand: MEDLINE INDUSTRIES, INC.

## (undated) DEVICE — SOLUTION IRRIG 3000ML 0.9% SOD CHL FLX CONT 0797208] ICU MEDICAL INC]

## (undated) DEVICE — SUTURE ABSORBABLE BRAIDED 0 CT-1 8X27 IN UD VICRYL JJ41G